# Patient Record
Sex: MALE | Race: WHITE | NOT HISPANIC OR LATINO | Employment: FULL TIME | URBAN - METROPOLITAN AREA
[De-identification: names, ages, dates, MRNs, and addresses within clinical notes are randomized per-mention and may not be internally consistent; named-entity substitution may affect disease eponyms.]

---

## 2017-01-30 ENCOUNTER — OFFICE VISIT (OUTPATIENT)
Dept: INTERNAL MEDICINE | Facility: CLINIC | Age: 57
End: 2017-01-30

## 2017-01-30 VITALS
HEIGHT: 68 IN | TEMPERATURE: 98.3 F | BODY MASS INDEX: 34.56 KG/M2 | RESPIRATION RATE: 16 BRPM | SYSTOLIC BLOOD PRESSURE: 124 MMHG | DIASTOLIC BLOOD PRESSURE: 92 MMHG | WEIGHT: 228 LBS | HEART RATE: 72 BPM

## 2017-01-30 DIAGNOSIS — N40.0 BENIGN NON-NODULAR PROSTATIC HYPERPLASIA WITHOUT LOWER URINARY TRACT SYMPTOMS: ICD-10-CM

## 2017-01-30 DIAGNOSIS — Z23 NEED FOR PROPHYLACTIC VACCINATION AND INOCULATION AGAINST INFLUENZA: ICD-10-CM

## 2017-01-30 DIAGNOSIS — Z00.00 PHYSICAL EXAM: Primary | ICD-10-CM

## 2017-01-30 LAB
ARTICHOKE IGE QN: 67 MG/DL (ref 0–130)
BILIRUB BLD-MCNC: NEGATIVE MG/DL
CHOLEST SERPL-MCNC: 149 MG/DL (ref 0–200)
CLARITY, POC: CLEAR
COLOR UR: YELLOW
EXPIRATION DATE: NORMAL
GLUCOSE UR STRIP-MCNC: NEGATIVE MG/DL
HCV AB SER DONR QL: NORMAL
HDLC SERPL-MCNC: 57 MG/DL (ref 40–60)
KETONES UR QL: NEGATIVE
LEUKOCYTE EST, POC: NEGATIVE
Lab: NORMAL
NITRITE UR-MCNC: NEGATIVE MG/ML
PH UR: 5 [PH] (ref 5–8)
PROT UR STRIP-MCNC: NEGATIVE MG/DL
PSA SERPL-MCNC: 0.7 NG/ML (ref 0–4)
RBC # UR STRIP: NEGATIVE /UL
SP GR UR: 1.02 (ref 1–1.03)
TRIGL SERPL-MCNC: 104 MG/DL (ref 0–150)
UROBILINOGEN UR QL: NORMAL

## 2017-01-30 PROCEDURE — 81003 URINALYSIS AUTO W/O SCOPE: CPT | Performed by: INTERNAL MEDICINE

## 2017-01-30 PROCEDURE — 84153 ASSAY OF PSA TOTAL: CPT | Performed by: INTERNAL MEDICINE

## 2017-01-30 PROCEDURE — 36415 COLL VENOUS BLD VENIPUNCTURE: CPT | Performed by: INTERNAL MEDICINE

## 2017-01-30 PROCEDURE — 86803 HEPATITIS C AB TEST: CPT | Performed by: INTERNAL MEDICINE

## 2017-01-30 PROCEDURE — 90471 IMMUNIZATION ADMIN: CPT | Performed by: INTERNAL MEDICINE

## 2017-01-30 PROCEDURE — 80061 LIPID PANEL: CPT | Performed by: INTERNAL MEDICINE

## 2017-01-30 PROCEDURE — 90686 IIV4 VACC NO PRSV 0.5 ML IM: CPT | Performed by: INTERNAL MEDICINE

## 2017-01-30 PROCEDURE — 99396 PREV VISIT EST AGE 40-64: CPT | Performed by: INTERNAL MEDICINE

## 2017-01-30 NOTE — MR AVS SNAPSHOT
Micah Moore   2017 8:00 AM   Office Visit    Provider:  Navneet Brewster MD   Department:  [facility]   Dept Phone:  [phone]                Your Full Care Plan              Your Updated Medication List      Notice  As of 2017  9:27 AM    You have not been prescribed any medications.            We Performed the Following     Hepatitis C Antibody     Lipid Panel     POC Urinalysis Dipstick, Automated     PSA       You Were Diagnosed With        Codes Comments    Physical exam    -  Primary ICD-10-CM: Z00.00  ICD-9-CM: V70.9     Benign non-nodular prostatic hyperplasia without lower urinary tract symptoms     ICD-10-CM: N40.0  ICD-9-CM: 600.90       Instructions     None    Patient Instructions History      [employer] Signup     [facility] [employer] allows you to send messages to your doctor, view your test results, renew your prescriptions, schedule appointments, and more. To sign up, go to [employer] and click on the Sign Up Now link in the New User? box. Enter your [employer] Activation Code exactly as it appears below along with the last four digits of your Social Security Number and your Date of Birth () to complete the sign-up process. If you do not sign up before the expiration date, you must request a new code.    [employer] Activation Code: [email or web address]  Expires: 2017  9:27 AM    If you have questions, you can email [email or web address] or call [phone] to talk to our [employer] staff. Remember, [employer] is NOT to be used for urgent needs. For medical emergencies, dial 911.               Other Info from Your Visit           Allergies     No Known Allergies      Reason for Visit     Annual Exam           Vital Signs     Blood Pressure Pulse Temperature Respirations    124/92 (BP Location: Left arm, Patient Position: Sitting, Cuff Size: Adult) 72 98.3 °F (36.8 °C) (Temporal Artery ) 16    "Height Weight Body Mass Index Smoking Status    67.75\" (172.1 cm) 228 lb (103 kg) 34.92 kg/m2 Never Smoker      Problems and Diagnoses Noted     Physical exam    -  Primary    Benign non-nodular prostatic hyperplasia without lower urinary tract symptoms          Results       "

## 2017-01-30 NOTE — PROGRESS NOTES
"Chief Complaint   Patient presents with   • Annual Exam       History of Present Illness      The patient presents for an established patient physical examination and health maintenance visit. The patient has had a colonoscopy. The last colonoscopy was on March 2, 2015. Most recent colonoscopy findings: polyps. His Tdap is up to date. He gets minimal exercise.    Medications    No current outpatient prescriptions on file.     Allergies    No Known Allergies    Problem List    Patient Active Problem List   Diagnosis   • Enlarged prostate without lower urinary tract symptoms (luts)       Physical Examination    Visit Vitals   • /92 (BP Location: Left arm, Patient Position: Sitting, Cuff Size: Adult)   • Pulse 72   • Temp 98.3 °F (36.8 °C) (Temporal Artery )   • Resp 16   • Ht 67.75\" (172.1 cm)   • Wt 228 lb (103 kg)   • BMI 34.92 kg/m2       HEENT: Head- Normocephalic Atraumatic. Facies- Within normal limits. Pinnas- Normal texture and shape bilaterally. Canals- Normal bilaterally. TMs- Normal bilaterally. Nares- Patent bilaterally. Nasal Septum- is normal. There is no tenderness to palpation over the frontal or maxillary sinuses. Lids- Normal bilaterally. Conjunctiva- Clear bilaterally. Sclera- Anicteric bilaterally. Oropharynx- Moist with no lesions. Tonsils- No enlargement, erythema or exudate.    Neck: Thyroid- non enlarged, symmetric and has no nodules. No bruits are detected. ROM- Normal Range of Motion with no rigidity.    Lymph Nodes: Cervical- no enlarged lymph nodes noted. Clavicular- Deferred. Axillary- Deferred. Inguinal- Deferred.    Lungs: Auscultation- Clear to auscultation bilaterally. There are no retractions, clubbing or cyanosis. The Expiratory to Inspiratory ratio is equal.    Cardiovascular: There are no carotid bruits. Heart- Normal Rate with Regular rhythm and no murmurs. There are no gallops. There are no rubs. In the lower extremities there is no edema. The upper extremities do not have " edema.    Abdomen: Soft, benign, non-tender with no masses, hernias, organomegaly or scars.    Male Genitourinary: The penis is circumcised with testicles found in the scrotum bilaterally. Testicular Size is normal bilaterally. The penis has no anatomic abnormalities.    Rectal: reveals normal external sphincter tone with no external lesions.    Prostate: The prostate gland is enlarged diffusely with no nodules.    The patient has no worrisome or suspicious skin lesions noted.    Impression and Assessment    Normal Physical Examination.    Benign Prostatic Hypertrophy.    Plan    Benign Prostatic Hypertrophy Plan: Further plans will be made after test results are available.    Counseling was provided regarding: Adequate Aerobic Exercise, Heart Healthy Diet, Low Fat Diet and Seat Belt Utilization.    The following was ordered for screening and health maintenance: Hepatitis C Antibody, Lipid Profile and U/A.       Immunizations Ordered and Administered: None.    Micah was seen today for annual exam.    Diagnoses and all orders for this visit:    Physical exam  -     POC Urinalysis Dipstick, Automated  -     Lipid Panel  -     PSA  -     Hepatitis C Antibody    Benign non-nodular prostatic hyperplasia without lower urinary tract symptoms  -     POC Urinalysis Dipstick, Automated  -     PSA          Return to Office    The patient was instructed to return for follow-up in 6 months. Next Visit: Follow-up.    The patient was instructed to return sooner if the condition changes, worsens, or doesn't resolve.

## 2017-04-07 ENCOUNTER — OFFICE VISIT (OUTPATIENT)
Dept: ORTHOPEDIC SURGERY | Facility: CLINIC | Age: 57
End: 2017-04-07

## 2017-04-07 VITALS
SYSTOLIC BLOOD PRESSURE: 150 MMHG | HEIGHT: 68 IN | DIASTOLIC BLOOD PRESSURE: 103 MMHG | BODY MASS INDEX: 34.1 KG/M2 | HEART RATE: 63 BPM | WEIGHT: 225 LBS

## 2017-04-07 DIAGNOSIS — R52 PAIN: ICD-10-CM

## 2017-04-07 DIAGNOSIS — M77.51 BURSITIS OF RIGHT FOOT: Primary | ICD-10-CM

## 2017-04-07 DIAGNOSIS — B07.0 PLANTAR WART OF RIGHT FOOT: ICD-10-CM

## 2017-04-07 PROCEDURE — 99203 OFFICE O/P NEW LOW 30 MIN: CPT | Performed by: PHYSICIAN ASSISTANT

## 2017-04-07 NOTE — PROGRESS NOTES
Subjective   Micah Moore is a 57 y.o. male.     Pain   Associated symptoms include headaches. Fever: gain.        {Common H&P Review Areas:11172}    Review of Systems   Constitutional: Positive for unexpected weight change. Fever: gain.   Eyes: Positive for visual disturbance.   Respiratory: Negative.         Snoring   Cardiovascular: Positive for leg swelling.   Gastrointestinal: Negative.    Endocrine: Negative.    Genitourinary: Positive for frequency.   Musculoskeletal: Negative.         Joint pain/swelling,  muscle pain   Skin: Negative.         itching   Allergic/Immunologic: Negative.    Neurological: Positive for headaches.   Hematological: Negative.    Psychiatric/Behavioral: Negative.        Objective   Physical Exam    Assessment/Plan   {Assess/PlanSmartLinks:03207}

## 2017-04-07 NOTE — PROGRESS NOTES
Subjective   Micah Moore is a 57 y.o. male.     History of Present Illness     {Common H&P Review Areas:74797}    Review of Systems   Constitutional: Positive for unexpected weight change. Fever: gain.   Eyes: Positive for visual disturbance.   Respiratory: Negative.         Snoring   Cardiovascular: Positive for leg swelling.   Gastrointestinal: Negative.    Endocrine: Negative.    Genitourinary: Positive for frequency.   Musculoskeletal: Negative.         Joint pain/swelling,  muscle pain   Skin: Negative.         itching   Allergic/Immunologic: Negative.    Neurological: Positive for headaches.   Hematological: Negative.    Psychiatric/Behavioral: Negative.        Objective   Physical Exam    Assessment/Plan   {Assess/PlanSmartLinks:14403}
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
## 2018-01-31 ENCOUNTER — OFFICE VISIT (OUTPATIENT)
Dept: INTERNAL MEDICINE | Facility: CLINIC | Age: 58
End: 2018-01-31

## 2018-01-31 ENCOUNTER — HOSPITAL ENCOUNTER (OUTPATIENT)
Dept: GENERAL RADIOLOGY | Facility: HOSPITAL | Age: 58
Discharge: HOME | End: 2018-01-31
Attending: INTERNAL MEDICINE | Admitting: INTERNAL MEDICINE

## 2018-01-31 VITALS
TEMPERATURE: 99.1 F | DIASTOLIC BLOOD PRESSURE: 78 MMHG | RESPIRATION RATE: 16 BRPM | HEART RATE: 72 BPM | BODY MASS INDEX: 35.01 KG/M2 | HEIGHT: 68 IN | WEIGHT: 231 LBS | SYSTOLIC BLOOD PRESSURE: 136 MMHG

## 2018-01-31 DIAGNOSIS — Z00.00 HEALTHCARE MAINTENANCE: ICD-10-CM

## 2018-01-31 DIAGNOSIS — Z00.00 PHYSICAL EXAM: Primary | ICD-10-CM

## 2018-01-31 DIAGNOSIS — G89.29 NECK PAIN, CHRONIC: ICD-10-CM

## 2018-01-31 DIAGNOSIS — M54.2 NECK PAIN, CHRONIC: ICD-10-CM

## 2018-01-31 DIAGNOSIS — N40.0 ENLARGED PROSTATE WITHOUT LOWER URINARY TRACT SYMPTOMS (LUTS): ICD-10-CM

## 2018-01-31 LAB
ALBUMIN SERPL-MCNC: 4.4 G/DL (ref 3.2–4.8)
ALBUMIN/GLOB SERPL: 1.6 G/DL (ref 1.5–2.5)
ALP SERPL-CCNC: 63 U/L (ref 25–100)
ALT SERPL W P-5'-P-CCNC: 32 U/L (ref 7–40)
ANION GAP SERPL CALCULATED.3IONS-SCNC: 7 MMOL/L (ref 3–11)
ARTICHOKE IGE QN: 65 MG/DL (ref 0–130)
AST SERPL-CCNC: 30 U/L (ref 0–33)
BILIRUB BLD-MCNC: NEGATIVE MG/DL
BILIRUB SERPL-MCNC: 0.6 MG/DL (ref 0.3–1.2)
BUN BLD-MCNC: 18 MG/DL (ref 9–23)
BUN/CREAT SERPL: 18 (ref 7–25)
CALCIUM SPEC-SCNC: 9.2 MG/DL (ref 8.7–10.4)
CHLORIDE SERPL-SCNC: 102 MMOL/L (ref 99–109)
CHOLEST SERPL-MCNC: 128 MG/DL (ref 0–200)
CLARITY, POC: CLEAR
CO2 SERPL-SCNC: 27 MMOL/L (ref 20–31)
COLOR UR: YELLOW
CREAT BLD-MCNC: 1 MG/DL (ref 0.6–1.3)
EXPIRATION DATE: ABNORMAL
GFR SERPL CREATININE-BSD FRML MDRD: 77 ML/MIN/1.73
GLOBULIN UR ELPH-MCNC: 2.8 GM/DL
GLUCOSE BLD-MCNC: 101 MG/DL (ref 70–100)
GLUCOSE UR STRIP-MCNC: NEGATIVE MG/DL
HDLC SERPL-MCNC: 51 MG/DL (ref 40–60)
KETONES UR QL: ABNORMAL
LEUKOCYTE EST, POC: NEGATIVE
Lab: ABNORMAL
NITRITE UR-MCNC: NEGATIVE MG/ML
PH UR: 5 [PH] (ref 5–8)
POTASSIUM BLD-SCNC: 4.3 MMOL/L (ref 3.5–5.5)
PROT SERPL-MCNC: 7.2 G/DL (ref 5.7–8.2)
PROT UR STRIP-MCNC: NEGATIVE MG/DL
PSA SERPL-MCNC: 0.6 NG/ML (ref 0–4)
RBC # UR STRIP: NEGATIVE /UL
SODIUM BLD-SCNC: 136 MMOL/L (ref 132–146)
SP GR UR: 1.01 (ref 1–1.03)
TRIGL SERPL-MCNC: 81 MG/DL (ref 0–150)
UROBILINOGEN UR QL: NORMAL

## 2018-01-31 PROCEDURE — 72040 X-RAY EXAM NECK SPINE 2-3 VW: CPT

## 2018-01-31 PROCEDURE — 80053 COMPREHEN METABOLIC PANEL: CPT | Performed by: INTERNAL MEDICINE

## 2018-01-31 PROCEDURE — G0103 PSA SCREENING: HCPCS | Performed by: INTERNAL MEDICINE

## 2018-01-31 PROCEDURE — 17000 DESTRUCT PREMALG LESION: CPT | Performed by: INTERNAL MEDICINE

## 2018-01-31 PROCEDURE — 81003 URINALYSIS AUTO W/O SCOPE: CPT | Performed by: INTERNAL MEDICINE

## 2018-01-31 PROCEDURE — 99396 PREV VISIT EST AGE 40-64: CPT | Performed by: INTERNAL MEDICINE

## 2018-01-31 PROCEDURE — 36415 COLL VENOUS BLD VENIPUNCTURE: CPT | Performed by: INTERNAL MEDICINE

## 2018-01-31 PROCEDURE — 80061 LIPID PANEL: CPT | Performed by: INTERNAL MEDICINE

## 2018-01-31 PROCEDURE — 17003 DESTRUCT PREMALG LES 2-14: CPT | Performed by: INTERNAL MEDICINE

## 2018-01-31 NOTE — PROGRESS NOTES
"Chief Complaint   Patient presents with   • Annual Exam       History of Present Illness    The patient presents for an established patient physical examination and health maintenance visit. The patient has had a colonoscopy.    The patient presents for a follow-up related to neck pain which began three months ago. He states that the pain was first noted to come on insidiously. The pain is not associated with fever, chills, weight loss, rashes, dysuria, hematuria or a decreased urine stream. The patient denies a past history of malignancy. The pain does not radiate. There is no numbness. The patient denies loss of bladder control. The patient denies loss of bowel control. The patient has attempted hot packs. The heat did give pain relief. The patient reports no aggravating factors.    Medications    No current outpatient prescriptions on file.     Allergies    No Known Allergies    Problem List    Patient Active Problem List   Diagnosis   • Enlarged prostate without lower urinary tract symptoms (luts)       Medications, Allergies, Problems List and Past History were reviewed and updated.    Physical Examination    /78 (BP Location: Left arm, Patient Position: Sitting, Cuff Size: Adult)  Pulse 72  Temp 99.1 °F (37.3 °C) (Temporal Artery )   Resp 16  Ht 172.7 cm (68\")  Wt 105 kg (231 lb)  BMI 35.12 kg/m2    HEENT: Head- Normocephalic Atraumatic. Facies- Within normal limits. Pinnas- Normal texture and shape bilaterally. Canals- Normal bilaterally. TMs- Normal bilaterally. Nares- Patent bilaterally. Nasal Septum- is normal. There is no tenderness to palpation over the frontal or maxillary sinuses. Lids- Normal bilaterally. Conjunctiva- Clear bilaterally. Sclera- Anicteric bilaterally. Oropharynx- Moist with no lesions. Tonsils- No enlargement, erythema or exudate.    Neck: Thyroid- non enlarged, symmetric and has no nodules. No bruits are detected. ROM- Normal Range of Motion with no rigidity.    Lymph " Nodes: Cervical- no enlarged lymph nodes noted. Clavicular- Deferred. Axillary- Deferred. Inguinal- Deferred.    Lungs: Auscultation- Clear to auscultation bilaterally. There are no retractions, clubbing or cyanosis. The Expiratory to Inspiratory ratio is equal.    Cardiovascular: There are no carotid bruits. Heart- Normal Rate with Regular rhythm and no murmurs. There are no gallops. There are no rubs. In the lower extremities there is no edema. The upper extremities do not have edema.    Abdomen: Soft, benign, non-tender with no masses, hernias, organomegaly or scars.    Male Genitourinary: The penis is uncircumcised with testicles found in the scrotum bilaterally. Testicular Size is normal bilaterally. The penis has no anatomic abnormalities.    Rectal: reveals normal external sphincter tone with no external lesions.    Prostate: The prostate gland is enlarged diffusely with no nodules.    Upper Extremity Neuro Exam: Muscle Strength is 5/5 in all major upper extremity muscle groups. Deep Tendon Reflexes are 2+ and equal in the biceps, triceps and brachioradialis distributions bilaterally. Sensation is normal in all distributions.    Lower Extremity Neuro Exam: Muscle Strength is 5/5 in all major lower extremity muscle groups. Deep Tendon Reflexes are 2+ and equal in the patellar and Achilles distributions bilaterally. Sensation is normal in all distributions.    The patient has a scaly area on his forehead. The scaly area is pink. The affected skin is crusted and flaking and the condition is noted to be well demarcated.    Radiology    My personal interpretation of the x-rays is as stated below:    The x-rays of the cervical region demonstrate normal jamar structure.    Impression and Assessment    Normal Physical Examination.    Actinic keratosis.    Neck Pain.    Benign Prostatic Hypertrophy.    Plan    Actinic keratosis Plan: Cryotherapy was applied.    Benign Prostatic Hypertrophy Plan: Further plans will be  made after test results are available.    Neck Pain Plan: Ibuprofen may be used for fever or pain. Monitor the symptoms closely. The patient was instructed to use over the counter NSAIDs for pain and inflammation.    Counseling was provided regarding: Dental Visits, Flossing Teeth, Heart Healthy Diet, Ideal Body Weight, Seat Belt Utilization and Weight Lose.    The following was ordered for screening and health maintenance: Lipid Profile, PSA and U/A.       Immunizations Ordered and Administered: None.    Procedure Notes    Two lesions were frozen with liquid nitrogen utilizing 20 second freezings x1. Wound care was explained.    Micah was seen today for annual exam.    Diagnoses and all orders for this visit:    Physical exam  -     Comprehensive Metabolic Panel  -     Lipid Panel  -     PSA Screen  -     POC Urinalysis Dipstick, Automated    Enlarged prostate without lower urinary tract symptoms (luts)  -     PSA Screen    Neck pain, chronic  -     XR Spine Cervical 2 or 3 View; Future    Healthcare maintenance  -     Comprehensive Metabolic Panel  -     Lipid Panel  -     PSA Screen  -     POC Urinalysis Dipstick, Automated        Return to Office    The patient was instructed to return for follow-up in 1 month.    The patient was instructed to return sooner if the condition changes, worsens, or doesn't resolve.

## 2019-02-05 ENCOUNTER — OFFICE VISIT (OUTPATIENT)
Dept: INTERNAL MEDICINE | Facility: CLINIC | Age: 59
End: 2019-02-05

## 2019-02-05 VITALS
DIASTOLIC BLOOD PRESSURE: 86 MMHG | WEIGHT: 223 LBS | TEMPERATURE: 98.6 F | HEIGHT: 68 IN | HEART RATE: 56 BPM | RESPIRATION RATE: 16 BRPM | BODY MASS INDEX: 33.8 KG/M2 | SYSTOLIC BLOOD PRESSURE: 130 MMHG

## 2019-02-05 DIAGNOSIS — Z23 NEED FOR IMMUNIZATION AGAINST INFLUENZA: ICD-10-CM

## 2019-02-05 DIAGNOSIS — Z00.00 PHYSICAL EXAM: Primary | ICD-10-CM

## 2019-02-05 LAB
ALBUMIN SERPL-MCNC: 4.49 G/DL (ref 3.2–4.8)
ALBUMIN/GLOB SERPL: 2.1 G/DL (ref 1.5–2.5)
ALP SERPL-CCNC: 63 U/L (ref 25–100)
ALT SERPL W P-5'-P-CCNC: 22 U/L (ref 7–40)
ANION GAP SERPL CALCULATED.3IONS-SCNC: 7 MMOL/L (ref 3–11)
ARTICHOKE IGE QN: 80 MG/DL (ref 0–130)
AST SERPL-CCNC: 26 U/L (ref 0–33)
BASOPHILS # BLD AUTO: 0.03 10*3/MM3 (ref 0–0.2)
BASOPHILS NFR BLD AUTO: 0.6 % (ref 0–1)
BILIRUB BLD-MCNC: NEGATIVE MG/DL
BILIRUB SERPL-MCNC: 0.5 MG/DL (ref 0.3–1.2)
BUN BLD-MCNC: 17 MG/DL (ref 9–23)
BUN/CREAT SERPL: 17 (ref 7–25)
CALCIUM SPEC-SCNC: 9.3 MG/DL (ref 8.7–10.4)
CHLORIDE SERPL-SCNC: 106 MMOL/L (ref 99–109)
CHOLEST SERPL-MCNC: 143 MG/DL (ref 0–200)
CLARITY, POC: CLEAR
CO2 SERPL-SCNC: 24 MMOL/L (ref 20–31)
COLOR UR: YELLOW
CREAT BLD-MCNC: 1 MG/DL (ref 0.6–1.3)
DEPRECATED RDW RBC AUTO: 45 FL (ref 37–54)
EOSINOPHIL # BLD AUTO: 0.31 10*3/MM3 (ref 0–0.3)
EOSINOPHIL NFR BLD AUTO: 5.8 % (ref 0–3)
ERYTHROCYTE [DISTWIDTH] IN BLOOD BY AUTOMATED COUNT: 13.6 % (ref 11.3–14.5)
EXPIRATION DATE: ABNORMAL
GFR SERPL CREATININE-BSD FRML MDRD: 77 ML/MIN/1.73
GFR SERPL CREATININE-BSD FRML MDRD: 93 ML/MIN/1.73
GLOBULIN UR ELPH-MCNC: 2.1 GM/DL
GLUCOSE BLD-MCNC: 103 MG/DL (ref 70–100)
GLUCOSE UR STRIP-MCNC: NEGATIVE MG/DL
HCT VFR BLD AUTO: 45.8 % (ref 38.9–50.9)
HDLC SERPL-MCNC: 49 MG/DL (ref 40–60)
HGB BLD-MCNC: 15.2 G/DL (ref 13.1–17.5)
IMM GRANULOCYTES # BLD AUTO: 0.01 10*3/MM3 (ref 0–0.03)
IMM GRANULOCYTES NFR BLD AUTO: 0.2 % (ref 0–0.6)
KETONES UR QL: NEGATIVE
LEUKOCYTE EST, POC: NEGATIVE
LYMPHOCYTES # BLD AUTO: 2.09 10*3/MM3 (ref 0.6–4.8)
LYMPHOCYTES NFR BLD AUTO: 38.9 % (ref 24–44)
Lab: ABNORMAL
MCH RBC QN AUTO: 29.9 PG (ref 27–31)
MCHC RBC AUTO-ENTMCNC: 33.2 G/DL (ref 32–36)
MCV RBC AUTO: 90 FL (ref 80–99)
MONOCYTES # BLD AUTO: 0.36 10*3/MM3 (ref 0–1)
MONOCYTES NFR BLD AUTO: 6.7 % (ref 0–12)
NEUTROPHILS # BLD AUTO: 2.57 10*3/MM3 (ref 1.5–8.3)
NEUTROPHILS NFR BLD AUTO: 47.8 % (ref 41–71)
NITRITE UR-MCNC: NEGATIVE MG/ML
PH UR: 5 [PH] (ref 5–8)
PLATELET # BLD AUTO: 173 10*3/MM3 (ref 150–450)
PMV BLD AUTO: 11.7 FL (ref 6–12)
POTASSIUM BLD-SCNC: 4.2 MMOL/L (ref 3.5–5.5)
PROT SERPL-MCNC: 6.6 G/DL (ref 5.7–8.2)
PROT UR STRIP-MCNC: NEGATIVE MG/DL
PSA SERPL-MCNC: 0.6 NG/ML (ref 0–4)
RBC # BLD AUTO: 5.09 10*6/MM3 (ref 4.2–5.76)
RBC # UR STRIP: ABNORMAL /UL
SODIUM BLD-SCNC: 137 MMOL/L (ref 132–146)
SP GR UR: 1.01 (ref 1–1.03)
TRIGL SERPL-MCNC: 108 MG/DL (ref 0–150)
TSH SERPL DL<=0.05 MIU/L-ACNC: 2.6 MIU/ML (ref 0.35–5.35)
UROBILINOGEN UR QL: NORMAL
WBC NRBC COR # BLD: 5.37 10*3/MM3 (ref 3.5–10.8)

## 2019-02-05 PROCEDURE — 80053 COMPREHEN METABOLIC PANEL: CPT | Performed by: INTERNAL MEDICINE

## 2019-02-05 PROCEDURE — 99396 PREV VISIT EST AGE 40-64: CPT | Performed by: INTERNAL MEDICINE

## 2019-02-05 PROCEDURE — 80061 LIPID PANEL: CPT | Performed by: INTERNAL MEDICINE

## 2019-02-05 PROCEDURE — 81003 URINALYSIS AUTO W/O SCOPE: CPT | Performed by: INTERNAL MEDICINE

## 2019-02-05 PROCEDURE — 36415 COLL VENOUS BLD VENIPUNCTURE: CPT | Performed by: INTERNAL MEDICINE

## 2019-02-05 PROCEDURE — 85025 COMPLETE CBC W/AUTO DIFF WBC: CPT | Performed by: INTERNAL MEDICINE

## 2019-02-05 PROCEDURE — G0103 PSA SCREENING: HCPCS | Performed by: INTERNAL MEDICINE

## 2019-02-05 PROCEDURE — 84443 ASSAY THYROID STIM HORMONE: CPT | Performed by: INTERNAL MEDICINE

## 2019-02-05 PROCEDURE — 90471 IMMUNIZATION ADMIN: CPT | Performed by: INTERNAL MEDICINE

## 2019-02-05 PROCEDURE — 90686 IIV4 VACC NO PRSV 0.5 ML IM: CPT | Performed by: INTERNAL MEDICINE

## 2019-02-05 NOTE — PATIENT INSTRUCTIONS
"Heart-Healthy Eating Plan  Heart-healthy meal planning includes:  · Limiting unhealthy fats.  · Increasing healthy fats.  · Making other small dietary changes.    You may need to talk with your doctor or a diet specialist (dietitian) to create an eating plan that is right for you.  What types of fat should I choose?  · Choose healthy fats. These include olive oil and canola oil, flaxseeds, walnuts, almonds, and seeds.  · Eat more omega-3 fats. These include salmon, mackerel, sardines, tuna, flaxseed oil, and ground flaxseeds. Try to eat fish at least twice each week.  · Limit saturated fats.  ? Saturated fats are often found in animal products, such as meats, butter, and cream.  ? Plant sources of saturated fats include palm oil, palm kernel oil, and coconut oil.  · Avoid foods with partially hydrogenated oils in them. These include stick margarine, some tub margarines, cookies, crackers, and other baked goods. These contain trans fats.  What general guidelines do I need to follow?  · Check food labels carefully. Identify foods with trans fats or high amounts of saturated fat.  · Fill one half of your plate with vegetables and green salads. Eat 4-5 servings of vegetables per day. A serving of vegetables is:  ? 1 cup of raw leafy vegetables.  ? ½ cup of raw or cooked cut-up vegetables.  ? ½ cup of vegetable juice.  · Fill one fourth of your plate with whole grains. Look for the word \"whole\" as the first word in the ingredient list.  · Fill one fourth of your plate with lean protein foods.  · Eat 4-5 servings of fruit per day. A serving of fruit is:  ? One medium whole fruit.  ? ¼ cup of dried fruit.  ? ½ cup of fresh, frozen, or canned fruit.  ? ½ cup of 100% fruit juice.  · Eat more foods that contain soluble fiber. These include apples, broccoli, carrots, beans, peas, and barley. Try to get 20-30 g of fiber per day.  · Eat more home-cooked food. Eat less restaurant, buffet, and fast food.  · Limit or avoid " alcohol.  · Limit foods high in starch and sugar.  · Avoid fried foods.  · Avoid frying your food. Try baking, boiling, grilling, or broiling it instead. You can also reduce fat by:  ? Removing the skin from poultry.  ? Removing all visible fats from meats.  ? Skimming the fat off of stews, soups, and gravies before serving them.  ? Steaming vegetables in water or broth.  · Lose weight if you are overweight.  · Eat 4-5 servings of nuts, legumes, and seeds per week:  ? One serving of dried beans or legumes equals ½ cup after being cooked.  ? One serving of nuts equals 1½ ounces.  ? One serving of seeds equals ½ ounce or one tablespoon.  · You may need to keep track of how much salt or sodium you eat. This is especially true if you have high blood pressure. Talk with your doctor or dietitian to get more information.  What foods can I eat?  Grains  Breads, including Gambian, white, kyle, wheat, raisin, rye, oatmeal, and Italian. Tortillas that are neither fried nor made with lard or trans fat. Low-fat rolls, including hotdog and hamburger buns and English muffins. Biscuits. Muffins. Waffles. Pancakes. Light popcorn. Whole-grain cereals. Flatbread. [place] toast. Pretzels. Breadsticks. Rusks. Low-fat snacks. Low-fat crackers, including oyster, saltine, matzo, abhay, animal, and rye. Rice and pasta, including brown rice and pastas that are made with whole wheat.  Vegetables  All vegetables.  Fruits  All fruits, but limit coconut.  Meats and Other Protein Sources  Lean, well-trimmed beef, veal, pork, and lamb. Chicken and turkey without skin. All fish and shellfish. Wild duck, rabbit, pheasant, and venison. Egg whites or low-cholesterol egg substitutes. Dried beans, peas, lentils, and tofu. Seeds and most nuts.  Dairy  Low-fat or nonfat cheeses, including ricotta, string, and mozzarella. Skim or 1% milk that is liquid, powdered, or evaporated. Buttermilk that is made with low-fat milk. Nonfat or low-fat  yogurt.  Beverages  Mineral water. Diet carbonated beverages.  Sweets and Desserts  Sherbets and fruit ices. Honey, jam, marmalade, jelly, and syrups. Meringues and gelatins. Pure sugar candy, such as hard candy, jelly beans, gumdrops, mints, marshmallows, and small amounts of dark chocolate. Conrad food cake.  Eat all sweets and desserts in moderation.  Fats and Oils  Nonhydrogenated (trans-free) margarines. Vegetable oils, including soybean, sesame, sunflower, olive, peanut, safflower, corn, canola, and cottonseed. Salad dressings or mayonnaise made with a vegetable oil. Limit added fats and oils that you use for cooking, baking, salads, and as spreads.  Other  Cocoa powder. Coffee and tea. All seasonings and condiments.  The items listed above may not be a complete list of recommended foods or beverages. Contact your dietitian for more options.  What foods are not recommended?  Grains  Breads that are made with saturated or trans fats, oils, or whole milk. Croissants. Butter rolls. Cheese breads. Sweet rolls. Donuts. Buttered popcorn. Chow mein noodles. High-fat crackers, such as cheese or butter crackers.  Meats and Other Protein Sources  Fatty meats, such as hotdogs, short ribs, sausage, spareribs, gonzalez, rib eye roast or steak, and mutton. High-fat deli meats, such as salami and bologna. Caviar. Domestic duck and goose. Organ meats, such as kidney, liver, sweetbreads, and heart.  Dairy  Cream, sour cream, cream cheese, and creamed cottage cheese. Whole-milk cheeses, including blue (rigoberto), Blaine Enrique, Brie, Thomas, American, Havarti, Swiss, cheddar, Camembert, and [place]. Whole or 2% milk that is liquid, evaporated, or condensed. Whole buttermilk. Cream sauce or high-fat cheese sauce. Yogurt that is made from whole milk.  Beverages  Regular sodas and juice drinks with added sugar.  Sweets and Desserts  Frosting. Pudding. Cookies. Cakes other than conrad food cake. Candy that has milk chocolate or white  chocolate, hydrogenated fat, butter, coconut, or unknown ingredients. Buttered syrups. Full-fat ice cream or ice cream drinks.  Fats and Oils  Gravy that has suet, meat fat, or shortening. Cocoa butter, hydrogenated oils, palm oil, coconut oil, palm kernel oil. These can often be found in baked products, candy, fried foods, nondairy creamers, and whipped toppings. Solid fats and shortenings, including gonzalez fat, salt pork, lard, and butter. Nondairy cream substitutes, such as coffee creamers and sour cream substitutes. Salad dressings that are made of unknown oils, cheese, or sour cream.  The items listed above may not be a complete list of foods and beverages to avoid. Contact your dietitian for more information.  This information is not intended to replace advice given to you by your health care provider. Make sure you discuss any questions you have with your health care provider.  Document Released: 06/18/2013 Document Revised: 05/25/2017 Document Reviewed: 06/11/2015  [employer] Patient Education © 2018 [employer]

## 2019-02-05 NOTE — PROGRESS NOTES
Chief Complaint   Patient presents with   • Annual Exam       History of Present Illness    The patient presents for an established patient physical examination and health maintenance visit.    Review of Systems    CONSTITUTIONAL- Denies Unexplained Weight Loss, Fever, Chills, Sweats, Fatigue, Weakness or Malaise.    NECK- Denies Decreased Range of Motion, Stiffness, Thyroid Nodules, Enlarged Lymph Nodes or Goiter.    EYES- Denies Eye Pain, Eye Drainage, Eye Redness, Eye Discharge, Decreased Vision, Visual Disturbance, Diplopia, Visual Loss or Swollen Eyelid.    HENT- Denies Nasal Discharge, Sore Throat, Ear Pain, Ear Drainage, Headache, Sinus Pain, Nasal Congestion, Decreased Hearing or Tinnitus.    PULMONARY- Denies Wheezing, Dyspnea, Sputum Production, Cough, Hemoptysis or Pleuritic Chest Pain.    GASTROINTESTINAL- Denies Abdominal Pain, Nausea, Vomiting, Diarrhea, Blood per Rectum, Constipation or Heartburn.    GENITOURINARY- Denies Penile Discharge, Erectile Dysfunction, Testicular Mass, Testicular Pain, Hernia, Nocturia, Decreased Urine Stream, Incomplete Voiding, Urinary Frequency, Urinary Urgency, Dysuria or Hematuria.    CARDIOVASCULAR- Denies Chest Pain, Claudication, Edema, Syncope, Palpitations or Irregular Heart Beat.    ENDOCRINE- Denies Cold Intolerance, Depression, Hair Loss, Heat Intolerance, Memory Loss, Polydypsia, Polyphagia, Polyuria, Sleep Disturbance or Weight Gain.    NEUROLOGIC- Denies Seizures, Visual Changes, Confusion or Excessive Daytime Sleepiness.    MUSCULOSKELETAL- Denies Joint Pain, Joint Stiffness, Decreased Range of Motion, Joint Swelling or Erythema of Joints.    INTEGUMENTARY- Denies Rash, Lumps, Sores, Itching, Dryness, Color Change, Changes in Hair, Brittle Nails, Discolored Nails, Thickened Nails, Growths or Alopecia.    Medications    No current outpatient medications on file.     Allergies    No Known Allergies    Problem List    Patient Active Problem List   Diagnosis   •  "Enlarged prostate without lower urinary tract symptoms (luts)       Medications, Allergies, Problems List and Past History were reviewed and updated.    Physical Examination    /86   Pulse 56   Temp 98.6 °F (37 °C) (Temporal)   Resp 16   Ht 173.4 cm (68.25\")   Wt 101 kg (223 lb)   BMI 33.66 kg/m²     HEENT: Head- Normocephalic Atraumatic. Facies- Within normal limits. Pinnas- Normal texture and shape bilaterally. Canals- Normal bilaterally. TMs- Normal bilaterally. Nares- Patent bilaterally. Nasal Septum- is normal. There is no tenderness to palpation over the frontal or maxillary sinuses. Lids- Normal bilaterally. Conjunctiva- Clear bilaterally. Sclera- Anicteric bilaterally. Oropharynx- Moist with no lesions. Tonsils- No enlargement, erythema or exudate.    Neck: Thyroid- non enlarged, symmetric and has no nodules. No bruits are detected. ROM- Normal Range of Motion with no rigidity.    Lungs: Auscultation- Clear to auscultation bilaterally. There are no retractions, clubbing or cyanosis. The Expiratory to Inspiratory ratio is equal.    Lymph Nodes: Cervical- no enlarged lymph nodes noted. Clavicular- Deferred. Axillary- Deferred. Inguinal- Deferred.    Cardiovascular: There are no carotid bruits. Heart- Normal Rate with Regular rhythm and no murmurs. There are no gallops. There are no rubs. In the lower extremities there is no edema. The upper extremities do not have edema.    Abdomen: Soft, benign, non-tender with no masses, hernias, organomegaly or scars.    Male Genitourinary: The penis is uncircumcised with testicles found in the scrotum bilaterally. Testicular Size is normal bilaterally. The penis has no anatomic abnormalities.    Rectal: reveals normal external sphincter tone with no external lesions.    Prostate: The prostate gland is symmetric and smooth with no nodularity.    Dermatologic: The patient has no worrisome or suspicious skin lesions noted.    Impression and Assessment    Normal " Physical Examination.    Encounter for Screening for Malignant Neoplasm of the Prostate.    Encounter for Immunization Administration.    Plan    Counseling was provided regarding: Adequate Aerobic Exercise, Dental Visits, Flossing Teeth and Heart Healthy Diet.    The following was ordered for screening and health maintenance: CBC w Automated Diff, CMP, Lipid Profile, PSA, TSH and U/A.    Counseled regarding immunizations and applicable VIS given.    Immunizations Ordered and Administered: Influenza.    Micah was seen today for annual exam.    Diagnoses and all orders for this visit:    Physical exam  -     Comprehensive Metabolic Panel  -     Lipid Panel  -     TSH  -     CBC & Differential  -     POC Urinalysis Dipstick, Automated  -     PSA Screen    Need for immunization against influenza  -     Fluarix/Fluzone/Afluria Quad>6 Months; Standing  -     Fluarix/Fluzone/Afluria Quad>6 Months        Return to Office    The patient was instructed to return for follow-up in 1 year.    The patient was instructed to return sooner if the condition changes, worsens, or does not resolve.

## 2019-10-09 ENCOUNTER — OFFICE VISIT (OUTPATIENT)
Dept: INTERNAL MEDICINE | Facility: CLINIC | Age: 59
End: 2019-10-09

## 2019-10-09 VITALS
BODY MASS INDEX: 32.98 KG/M2 | HEIGHT: 68 IN | HEART RATE: 69 BPM | DIASTOLIC BLOOD PRESSURE: 82 MMHG | RESPIRATION RATE: 18 BRPM | WEIGHT: 217.6 LBS | SYSTOLIC BLOOD PRESSURE: 140 MMHG | TEMPERATURE: 97.9 F | OXYGEN SATURATION: 96 %

## 2019-10-09 DIAGNOSIS — J01.80 OTHER ACUTE SINUSITIS, RECURRENCE NOT SPECIFIED: Primary | ICD-10-CM

## 2019-10-09 DIAGNOSIS — J02.9 ACUTE PHARYNGITIS, UNSPECIFIED ETIOLOGY: ICD-10-CM

## 2019-10-09 DIAGNOSIS — B36.0 TINEA VERSICOLOR: ICD-10-CM

## 2019-10-09 DIAGNOSIS — H60.393 OTHER INFECTIVE ACUTE OTITIS EXTERNA OF BOTH EARS: ICD-10-CM

## 2019-10-09 LAB
EXPIRATION DATE: NORMAL
INTERNAL CONTROL: NORMAL
Lab: NORMAL
S PYO AG THROAT QL: NEGATIVE

## 2019-10-09 PROCEDURE — 99214 OFFICE O/P EST MOD 30 MIN: CPT | Performed by: INTERNAL MEDICINE

## 2019-10-09 PROCEDURE — 87880 STREP A ASSAY W/OPTIC: CPT | Performed by: INTERNAL MEDICINE

## 2019-10-09 RX ORDER — KETOCONAZOLE 20 MG/G
CREAM TOPICAL 2 TIMES DAILY
Qty: 60 G | Refills: 1 | Status: SHIPPED | OUTPATIENT
Start: 2019-10-09 | End: 2019-10-23

## 2019-10-09 RX ORDER — AMOXICILLIN 875 MG/1
875 TABLET, COATED ORAL 2 TIMES DAILY
Qty: 20 TABLET | Refills: 0 | Status: SHIPPED | OUTPATIENT
Start: 2019-10-09 | End: 2019-10-19

## 2019-10-09 RX ORDER — OFLOXACIN 3 MG/ML
10 SOLUTION AURICULAR (OTIC) DAILY
Qty: 10 ML | Refills: 0 | Status: SHIPPED | OUTPATIENT
Start: 2019-10-09 | End: 2019-10-16

## 2019-10-09 NOTE — PROGRESS NOTES
Subjective       Micah Moore is a 59 y.o. male.     Chief Complaint   Patient presents with   • Earache     x 1 month,facial pressure/pain, headache,       History obtained from the patient.      Earache    There is pain in both (right first) ears. This is a new problem. Episode onset: 1 month ago. Episode frequency: intermittent. The problem has been unchanged. There has been no fever. Pain severity now: mild to moderate. Associated symptoms include headaches, neck pain, a rash and a sore throat. Pertinent negatives include no abdominal pain, coughing, diarrhea, ear discharge, hearing loss, rhinorrhea or vomiting. He has tried NSAIDs for the symptoms. The treatment provided moderate relief. There is no history of a chronic ear infection or hearing loss.   Rash   This is a new problem. Episode onset: 2-3 months ago. The problem is unchanged. The affected locations include the back. Rash characteristics: white spots. He was exposed to nothing. Associated symptoms include fatigue and a sore throat. Pertinent negatives include no congestion, cough, diarrhea, eye pain, fever, rhinorrhea, shortness of breath or vomiting. Past treatments include nothing. There is no history of allergies, asthma or eczema.        The following portions of the patient's history were reviewed and updated as appropriate: allergies, current medications, past family history, past medical history, past social history, past surgical history and problem list.      Review of Systems   Constitutional: Positive for fatigue. Negative for chills and fever.   HENT: Positive for ear pain and sore throat. Negative for congestion, ear discharge, hearing loss, postnasal drip, rhinorrhea, sinus pressure, sinus pain and voice change.    Eyes: Negative for pain, discharge, redness and itching.   Respiratory: Negative for cough, shortness of breath and wheezing.    Cardiovascular: Negative for chest pain.   Gastrointestinal: Positive for nausea. Negative  "for abdominal pain, diarrhea and vomiting.   Musculoskeletal: Positive for arthralgias (chronic), myalgias and neck pain.   Skin: Positive for rash.   Neurological: Positive for weakness and headaches.   Hematological: Positive for adenopathy (?).           Objective     Blood pressure 140/82, pulse 69, temperature 97.9 °F (36.6 °C), temperature source Temporal, resp. rate 18, height 173.4 cm (68.25\"), weight 98.7 kg (217 lb 9.6 oz), SpO2 96 %.    Physical Exam   Constitutional:   Obese.   HENT:   Head: Normocephalic and atraumatic.   Right Ear: Tympanic membrane, external ear and ear canal normal.   Left Ear: Tympanic membrane, external ear and ear canal normal.   Mouth/Throat: Mucous membranes are normal. No oral lesions. Posterior oropharyngeal erythema (mild) present. No posterior oropharyngeal edema. Tonsils are 1+ on the right. Tonsils are 1+ on the left. No tonsillar exudate (mild erythema+).   Tonsils normal.  There is bilateral frontal, but not maxillary, sinus tenderness to palpation.   Eyes: Conjunctivae are normal.   Neck: Normal range of motion. Neck supple.   Cardiovascular: Normal rate and regular rhythm.   No murmur heard.  Pulmonary/Chest: Effort normal and breath sounds normal.   Lymphadenopathy:     He has no cervical adenopathy (there is tenderness to palpation in the bilateral anterior, but not posterior, cervical area).   Skin: Rash (hypopigmented macular rash diffusely over middla and upper back) noted.   Psychiatric: He has a normal mood and affect.   Nursing note and vitals reviewed.      Results for orders placed or performed in visit on 10/09/19   POC Rapid Strep A   Result Value Ref Range    Rapid Strep A Screen Negative Negative, VALID, INVALID, Not Performed    Internal Control Passed Passed    Lot Number [id number]     Expiration Date 2-16-21        Assessment/Plan   Micah was seen today for earache.    Diagnoses and all orders for this visit:    Other acute sinusitis, recurrence not " specified  -     amoxicillin (AMOXIL) 875 MG tablet; Take 1 tablet by mouth 2 (Two) Times a Day for 10 days.   Recommended Flonase and Sudafed over-the-counter, and plenty of fluids.     The patient agrees to call if his headache does not resolve within 2 to 3 days.    Acute pharyngitis, unspecified etiology  -     POC Rapid Strep A    Other infective acute otitis externa of both ears  -     ofloxacin (FLOXIN OTIC) 0.3 % otic solution; Administer 10 drops into ear(s) as directed by provider Daily for 7 days.    Tinea versicolor  -     ketoconazole (NIZORAL) 2 % cream; Apply  topically to the appropriate area as directed 2 (Two) Times a Day for 14 days.        Return if symptoms worsen or fail to improve.

## 2020-04-23 ENCOUNTER — OFFICE VISIT (OUTPATIENT)
Dept: INTERNAL MEDICINE | Facility: CLINIC | Age: 60
End: 2020-04-23

## 2020-04-23 VITALS
WEIGHT: 224 LBS | TEMPERATURE: 98.3 F | DIASTOLIC BLOOD PRESSURE: 82 MMHG | BODY MASS INDEX: 33.95 KG/M2 | OXYGEN SATURATION: 97 % | HEART RATE: 67 BPM | SYSTOLIC BLOOD PRESSURE: 138 MMHG | HEIGHT: 68 IN | RESPIRATION RATE: 20 BRPM

## 2020-04-23 DIAGNOSIS — Z00.00 PHYSICAL EXAM: Primary | ICD-10-CM

## 2020-04-23 DIAGNOSIS — Z12.5 PROSTATE CANCER SCREENING: ICD-10-CM

## 2020-04-23 LAB
BILIRUB BLD-MCNC: NEGATIVE MG/DL
CLARITY, POC: CLEAR
COLOR UR: YELLOW
EXPIRATION DATE: ABNORMAL
GLUCOSE UR STRIP-MCNC: NEGATIVE MG/DL
KETONES UR QL: ABNORMAL
LEUKOCYTE EST, POC: NEGATIVE
Lab: ABNORMAL
NITRITE UR-MCNC: NEGATIVE MG/ML
PH UR: 6 [PH] (ref 5–8)
PROT UR STRIP-MCNC: NEGATIVE MG/DL
RBC # UR STRIP: NEGATIVE /UL
SP GR UR: 1.02 (ref 1–1.03)
UROBILINOGEN UR QL: NORMAL

## 2020-04-23 PROCEDURE — 80061 LIPID PANEL: CPT | Performed by: INTERNAL MEDICINE

## 2020-04-23 PROCEDURE — 81003 URINALYSIS AUTO W/O SCOPE: CPT | Performed by: INTERNAL MEDICINE

## 2020-04-23 PROCEDURE — 85025 COMPLETE CBC W/AUTO DIFF WBC: CPT | Performed by: INTERNAL MEDICINE

## 2020-04-23 PROCEDURE — 84443 ASSAY THYROID STIM HORMONE: CPT | Performed by: INTERNAL MEDICINE

## 2020-04-23 PROCEDURE — 80053 COMPREHEN METABOLIC PANEL: CPT | Performed by: INTERNAL MEDICINE

## 2020-04-23 PROCEDURE — 36415 COLL VENOUS BLD VENIPUNCTURE: CPT | Performed by: INTERNAL MEDICINE

## 2020-04-23 PROCEDURE — G0103 PSA SCREENING: HCPCS | Performed by: INTERNAL MEDICINE

## 2020-04-23 PROCEDURE — 99396 PREV VISIT EST AGE 40-64: CPT | Performed by: INTERNAL MEDICINE

## 2020-04-23 NOTE — PROGRESS NOTES
Chief Complaint   Patient presents with   • Annual Exam       History of Present Illness    The patient presents for an established patient physical examination and health maintenance visit.    Review of Systems    CONSTITUTIONAL- Denies Unexplained Weight Loss, Fever, Chills, Sweats, Fatigue, Weakness or Malaise.    NECK- Denies Decreased Range of Motion, Stiffness, Thyroid Nodules, Enlarged Lymph Nodes or Goiter.    EYES- Denies Eye Pain, Eye Drainage, Eye Redness, Eye Discharge, Decreased Vision, Visual Disturbance, Diplopia, Visual Loss or Swollen Eyelid.    HENT- Denies Nasal Discharge, Sore Throat, Ear Pain, Ear Drainage, Headache, Sinus Pain, Nasal Congestion, Decreased Hearing or Tinnitus.    PULMONARY- Denies Wheezing, Dyspnea, Sputum Production, Cough, Hemoptysis or Pleuritic Chest Pain.    GASTROINTESTINAL- Denies Abdominal Pain, Nausea, Vomiting, Diarrhea, Blood per Rectum, Constipation or Heartburn.    GENITOURINARY- Denies Penile Discharge, Erectile Dysfunction, Testicular Mass, Testicular Pain, Hernia, Nocturia, Decreased Urine Stream, Incomplete Voiding, Urinary Frequency, Urinary Urgency, Dysuria or Hematuria.    CARDIOVASCULAR- Denies Chest Pain, Claudication, Edema, Syncope, Palpitations or Irregular Heart Beat.    ENDOCRINE- Denies Cold Intolerance, Depression, Hair Loss, Heat Intolerance, Memory Loss, Polydypsia, Polyphagia, Polyuria, Sleep Disturbance or Weight Gain.    NEUROLOGIC- Denies Seizures, Visual Changes, Confusion or Excessive Daytime Sleepiness.    MUSCULOSKELETAL- Denies Joint Pain, Joint Stiffness, Decreased Range of Motion, Joint Swelling or Erythema of Joints.    INTEGUMENTARY- Denies Rash, Lumps, Sores, Itching, Dryness, Color Change, Changes in Hair, Brittle Nails, Discolored Nails, Thickened Nails, Growths or Alopecia.    Medications    No current outpatient medications on file.     Allergies    No Known Allergies    Problem List    Patient Active Problem List   Diagnosis   •  "Enlarged prostate without lower urinary tract symptoms (luts)       Medications, Allergies, Problems List and Past History were reviewed and updated.    Physical Examination    /82 (BP Location: Right arm, Patient Position: Sitting)   Pulse 67   Temp 98.3 °F (36.8 °C) (Temporal)   Resp 20   Ht 172.1 cm (67.75\")   Wt 102 kg (224 lb)   SpO2 97%   BMI 34.31 kg/m²     HEENT: Head- Normocephalic Atraumatic. Facies- Within normal limits. Pinnas- Normal texture and shape bilaterally. Canals- Normal bilaterally. TMs- Normal bilaterally. Nares- Patent bilaterally. Nasal Septum- is normal. There is no tenderness to palpation over the frontal or maxillary sinuses. Lids- Normal bilaterally. Conjunctiva- Clear bilaterally. Sclera- Anicteric bilaterally. Oropharynx- Moist with no lesions. Tonsils- No enlargement, erythema or exudate.    Neck: Thyroid- non enlarged, symmetric and has no nodules. No bruits are detected. ROM- Normal Range of Motion with no rigidity.    Lungs: Auscultation- Clear to auscultation bilaterally. There are no retractions, clubbing or cyanosis. The Expiratory to Inspiratory ratio is equal.    Lymph Nodes: Cervical- no enlarged lymph nodes noted. Clavicular- no enlarged supraclavicular lymph nodes noted. Axillary- no enlarged axillary lymph nodes noted. Inguinal- no enlarged inguinal lymph nodes noted.    Cardiovascular: There are no carotid bruits. Heart- Normal Rate with Regular rhythm and no murmurs. There are no gallops. There are no rubs. In the lower extremities there is no edema. The upper extremities do not have edema.    Abdomen: Soft, benign, non-tender with no masses, hernias, organomegaly or scars.    Male Genitourinary: The penis is uncircumcised with testicles found in the scrotum bilaterally. Testicular Size is normal bilaterally. The penis has no anatomic abnormalities.    Rectal: reveals normal external sphincter tone with no external lesions.    Prostate: The prostate gland is " symmetric and smooth with no nodularity.    Dermatologic: The patient has no worrisome or suspicious skin lesions noted.    Impression and Assessment    Normal Physical Examination.    Encounter for Screening for Malignant Neoplasm of the Prostate.    Plan    Counseling was provided regarding: Adequate Aerobic Exercise, Dental Visits, Flossing Teeth and Heart Healthy Diet.    The following was ordered for screening and health maintenance: Cardiac Calcium CT Scan, CBC w Automated Diff, CMP, Lipid Profile, PSA, TSH and U/A.       Immunizations Ordered and Administered: None.    Micah was seen today for annual exam.    Diagnoses and all orders for this visit:    Physical exam  -     Comprehensive Metabolic Panel  -     Lipid Panel  -     TSH  -     CBC & Differential  -     POC Urinalysis Dipstick, Automated  -     CT Cardiac Calcium Score Without Dye; Future    Prostate cancer screening  -     PSA Screen        Return to Office    The patient was instructed to return for follow-up in 1 year. Next Visit: Physical.    The patient was instructed to return sooner if the condition changes, worsens, or does not resolve.

## 2020-04-24 LAB
ALBUMIN SERPL-MCNC: 4.3 G/DL (ref 3.5–5.2)
ALBUMIN/GLOB SERPL: 1.3 G/DL
ALP SERPL-CCNC: 62 U/L (ref 39–117)
ALT SERPL W P-5'-P-CCNC: 18 U/L (ref 1–41)
ANION GAP SERPL CALCULATED.3IONS-SCNC: 14.4 MMOL/L (ref 5–15)
AST SERPL-CCNC: 19 U/L (ref 1–40)
BASOPHILS # BLD AUTO: 0.03 10*3/MM3 (ref 0–0.2)
BASOPHILS NFR BLD AUTO: 0.5 % (ref 0–1.5)
BILIRUB SERPL-MCNC: 0.6 MG/DL (ref 0.2–1.2)
BUN BLD-MCNC: 16 MG/DL (ref 6–20)
BUN/CREAT SERPL: 18.6 (ref 7–25)
CALCIUM SPEC-SCNC: 9.3 MG/DL (ref 8.6–10.5)
CHLORIDE SERPL-SCNC: 104 MMOL/L (ref 98–107)
CHOLEST SERPL-MCNC: 134 MG/DL (ref 0–200)
CO2 SERPL-SCNC: 23.6 MMOL/L (ref 22–29)
CREAT BLD-MCNC: 0.86 MG/DL (ref 0.76–1.27)
DEPRECATED RDW RBC AUTO: 43.6 FL (ref 37–54)
EOSINOPHIL # BLD AUTO: 0.37 10*3/MM3 (ref 0–0.4)
EOSINOPHIL NFR BLD AUTO: 6.2 % (ref 0.3–6.2)
ERYTHROCYTE [DISTWIDTH] IN BLOOD BY AUTOMATED COUNT: 13.3 % (ref 12.3–15.4)
GFR SERPL CREATININE-BSD FRML MDRD: 91 ML/MIN/1.73
GLOBULIN UR ELPH-MCNC: 3.3 GM/DL
GLUCOSE BLD-MCNC: 87 MG/DL (ref 65–99)
HCT VFR BLD AUTO: 45.2 % (ref 37.5–51)
HDLC SERPL-MCNC: 52 MG/DL (ref 40–60)
HGB BLD-MCNC: 15.5 G/DL (ref 13–17.7)
IMM GRANULOCYTES # BLD AUTO: 0.01 10*3/MM3 (ref 0–0.05)
IMM GRANULOCYTES NFR BLD AUTO: 0.2 % (ref 0–0.5)
LDLC SERPL CALC-MCNC: 68 MG/DL (ref 0–100)
LDLC/HDLC SERPL: 1.31 {RATIO}
LYMPHOCYTES # BLD AUTO: 2.27 10*3/MM3 (ref 0.7–3.1)
LYMPHOCYTES NFR BLD AUTO: 38.2 % (ref 19.6–45.3)
MCH RBC QN AUTO: 30.7 PG (ref 26.6–33)
MCHC RBC AUTO-ENTMCNC: 34.3 G/DL (ref 31.5–35.7)
MCV RBC AUTO: 89.5 FL (ref 79–97)
MONOCYTES # BLD AUTO: 0.42 10*3/MM3 (ref 0.1–0.9)
MONOCYTES NFR BLD AUTO: 7.1 % (ref 5–12)
NEUTROPHILS # BLD AUTO: 2.84 10*3/MM3 (ref 1.7–7)
NEUTROPHILS NFR BLD AUTO: 47.8 % (ref 42.7–76)
NRBC BLD AUTO-RTO: 0 /100 WBC (ref 0–0.2)
PLATELET # BLD AUTO: 179 10*3/MM3 (ref 140–450)
PMV BLD AUTO: 11.9 FL (ref 6–12)
POTASSIUM BLD-SCNC: 4.4 MMOL/L (ref 3.5–5.2)
PROT SERPL-MCNC: 7.6 G/DL (ref 6–8.5)
PSA SERPL-MCNC: 0.78 NG/ML (ref 0–4)
RBC # BLD AUTO: 5.05 10*6/MM3 (ref 4.14–5.8)
SODIUM BLD-SCNC: 142 MMOL/L (ref 136–145)
TRIGL SERPL-MCNC: 69 MG/DL (ref 0–150)
TSH SERPL DL<=0.05 MIU/L-ACNC: 1.87 UIU/ML (ref 0.27–4.2)
VLDLC SERPL-MCNC: 13.8 MG/DL (ref 5–40)
WBC NRBC COR # BLD: 5.94 10*3/MM3 (ref 3.4–10.8)

## 2021-04-27 ENCOUNTER — OFFICE VISIT (OUTPATIENT)
Dept: INTERNAL MEDICINE | Facility: CLINIC | Age: 61
End: 2021-04-27

## 2021-04-27 VITALS
HEIGHT: 68 IN | TEMPERATURE: 97.5 F | BODY MASS INDEX: 34.25 KG/M2 | RESPIRATION RATE: 16 BRPM | HEART RATE: 64 BPM | DIASTOLIC BLOOD PRESSURE: 82 MMHG | WEIGHT: 226 LBS | SYSTOLIC BLOOD PRESSURE: 132 MMHG

## 2021-04-27 DIAGNOSIS — Z00.00 PHYSICAL EXAM: Primary | ICD-10-CM

## 2021-04-27 DIAGNOSIS — Z12.5 PROSTATE CANCER SCREENING: ICD-10-CM

## 2021-04-27 DIAGNOSIS — R31.9 HEMATURIA, UNSPECIFIED TYPE: ICD-10-CM

## 2021-04-27 LAB
ALBUMIN SERPL-MCNC: 4.5 G/DL (ref 3.5–5.2)
ALBUMIN/GLOB SERPL: 1.5 G/DL
ALP SERPL-CCNC: 68 U/L (ref 39–117)
ALT SERPL W P-5'-P-CCNC: 20 U/L (ref 1–41)
ANION GAP SERPL CALCULATED.3IONS-SCNC: 11.8 MMOL/L (ref 5–15)
AST SERPL-CCNC: 20 U/L (ref 1–40)
BACTERIA UR QL AUTO: ABNORMAL /HPF
BASOPHILS # BLD AUTO: 0.04 10*3/MM3 (ref 0–0.2)
BASOPHILS NFR BLD AUTO: 0.7 % (ref 0–1.5)
BILIRUB BLD-MCNC: ABNORMAL MG/DL
BILIRUB SERPL-MCNC: 0.6 MG/DL (ref 0–1.2)
BUN SERPL-MCNC: 18 MG/DL (ref 6–20)
BUN/CREAT SERPL: 17.1 (ref 7–25)
CALCIUM SPEC-SCNC: 9.2 MG/DL (ref 8.6–10.5)
CHLORIDE SERPL-SCNC: 105 MMOL/L (ref 98–107)
CHOLEST SERPL-MCNC: 134 MG/DL (ref 0–200)
CLARITY, POC: CLEAR
CO2 SERPL-SCNC: 24.2 MMOL/L (ref 22–29)
COLOR UR: ABNORMAL
CREAT SERPL-MCNC: 1.05 MG/DL (ref 0.76–1.27)
DEPRECATED RDW RBC AUTO: 42.4 FL (ref 37–54)
EOSINOPHIL # BLD AUTO: 0.39 10*3/MM3 (ref 0–0.4)
EOSINOPHIL NFR BLD AUTO: 6.7 % (ref 0.3–6.2)
ERYTHROCYTE [DISTWIDTH] IN BLOOD BY AUTOMATED COUNT: 13.2 % (ref 12.3–15.4)
EXPIRATION DATE: ABNORMAL
GFR SERPL CREATININE-BSD FRML MDRD: 72 ML/MIN/1.73
GLOBULIN UR ELPH-MCNC: 3.1 GM/DL
GLUCOSE SERPL-MCNC: 99 MG/DL (ref 65–99)
GLUCOSE UR STRIP-MCNC: NEGATIVE MG/DL
HCT VFR BLD AUTO: 46 % (ref 37.5–51)
HDLC SERPL-MCNC: 54 MG/DL (ref 40–60)
HGB BLD-MCNC: 15.6 G/DL (ref 13–17.7)
HYALINE CASTS UR QL AUTO: ABNORMAL /LPF
IMM GRANULOCYTES # BLD AUTO: 0.01 10*3/MM3 (ref 0–0.05)
IMM GRANULOCYTES NFR BLD AUTO: 0.2 % (ref 0–0.5)
KETONES UR QL: NEGATIVE
LDLC SERPL CALC-MCNC: 62 MG/DL (ref 0–100)
LDLC/HDLC SERPL: 1.11 {RATIO}
LEUKOCYTE EST, POC: NEGATIVE
LYMPHOCYTES # BLD AUTO: 2.18 10*3/MM3 (ref 0.7–3.1)
LYMPHOCYTES NFR BLD AUTO: 37.3 % (ref 19.6–45.3)
Lab: ABNORMAL
MCH RBC QN AUTO: 29.8 PG (ref 26.6–33)
MCHC RBC AUTO-ENTMCNC: 33.9 G/DL (ref 31.5–35.7)
MCV RBC AUTO: 87.8 FL (ref 79–97)
MONOCYTES # BLD AUTO: 0.37 10*3/MM3 (ref 0.1–0.9)
MONOCYTES NFR BLD AUTO: 6.3 % (ref 5–12)
NEUTROPHILS NFR BLD AUTO: 2.86 10*3/MM3 (ref 1.7–7)
NEUTROPHILS NFR BLD AUTO: 48.8 % (ref 42.7–76)
NITRITE UR-MCNC: NEGATIVE MG/ML
NRBC BLD AUTO-RTO: 0.2 /100 WBC (ref 0–0.2)
PH UR: 5 [PH] (ref 5–8)
PLATELET # BLD AUTO: 196 10*3/MM3 (ref 140–450)
PMV BLD AUTO: 11.6 FL (ref 6–12)
POTASSIUM SERPL-SCNC: 4 MMOL/L (ref 3.5–5.2)
PROT SERPL-MCNC: 7.6 G/DL (ref 6–8.5)
PROT UR STRIP-MCNC: NEGATIVE MG/DL
PSA SERPL-MCNC: 0.81 NG/ML (ref 0–4)
RBC # BLD AUTO: 5.24 10*6/MM3 (ref 4.14–5.8)
RBC # UR STRIP: ABNORMAL /UL
RBC # UR: ABNORMAL /HPF
REF LAB TEST METHOD: ABNORMAL
SODIUM SERPL-SCNC: 141 MMOL/L (ref 136–145)
SP GR UR: 1.02 (ref 1–1.03)
SQUAMOUS #/AREA URNS HPF: ABNORMAL /HPF
TRIGL SERPL-MCNC: 99 MG/DL (ref 0–150)
TSH SERPL DL<=0.05 MIU/L-ACNC: 2.62 UIU/ML (ref 0.27–4.2)
UROBILINOGEN UR QL: ABNORMAL
VLDLC SERPL-MCNC: 18 MG/DL (ref 5–40)
WBC # BLD AUTO: 5.85 10*3/MM3 (ref 3.4–10.8)
WBC UR QL AUTO: ABNORMAL /HPF

## 2021-04-27 PROCEDURE — 81003 URINALYSIS AUTO W/O SCOPE: CPT | Performed by: INTERNAL MEDICINE

## 2021-04-27 PROCEDURE — 80061 LIPID PANEL: CPT | Performed by: INTERNAL MEDICINE

## 2021-04-27 PROCEDURE — 99396 PREV VISIT EST AGE 40-64: CPT | Performed by: INTERNAL MEDICINE

## 2021-04-27 PROCEDURE — 80053 COMPREHEN METABOLIC PANEL: CPT | Performed by: INTERNAL MEDICINE

## 2021-04-27 PROCEDURE — 81015 MICROSCOPIC EXAM OF URINE: CPT | Performed by: INTERNAL MEDICINE

## 2021-04-27 PROCEDURE — 84443 ASSAY THYROID STIM HORMONE: CPT | Performed by: INTERNAL MEDICINE

## 2021-04-27 PROCEDURE — G0103 PSA SCREENING: HCPCS | Performed by: INTERNAL MEDICINE

## 2021-04-27 PROCEDURE — 36415 COLL VENOUS BLD VENIPUNCTURE: CPT | Performed by: INTERNAL MEDICINE

## 2021-04-27 PROCEDURE — 85025 COMPLETE CBC W/AUTO DIFF WBC: CPT | Performed by: INTERNAL MEDICINE

## 2021-04-27 NOTE — PROGRESS NOTES
Chief Complaint   Patient presents with   • Annual Exam       History of Present Illness    The patient presents for an established patient physical examination and health maintenance visit.    Review of Systems    CONSTITUTIONAL- Denies Unexplained Weight Loss, Fever, Chills, Sweats, Fatigue, Weakness or Malaise.    NECK- Denies Decreased Range of Motion, Stiffness, Thyroid Nodules, Enlarged Lymph Nodes or Goiter.    EYES- Denies Eye Pain, Eye Drainage, Eye Redness, Eye Discharge, Decreased Vision, Visual Disturbance, Diplopia, Visual Loss or Swollen Eyelid.    HENT- Denies Nasal Discharge, Sore Throat, Ear Pain, Ear Drainage, Headache, Sinus Pain, Nasal Congestion, Decreased Hearing or Tinnitus.    PULMONARY- Denies Wheezing, Dyspnea, Sputum Production, Cough, Hemoptysis or Pleuritic Chest Pain.    GASTROINTESTINAL- Denies Abdominal Pain, Nausea, Vomiting, Diarrhea, Blood per Rectum, Constipation or Heartburn.    GENITOURINARY- Denies Penile Discharge, Erectile Dysfunction, Testicular Mass, Testicular Pain, Hernia, Nocturia, Decreased Urine Stream, Incomplete Voiding, Urinary Frequency, Urinary Urgency, Dysuria or Hematuria.    CARDIOVASCULAR- Denies Chest Pain, Claudication, Edema, Syncope, Palpitations or Irregular Heart Beat.    ENDOCRINE- Denies Cold Intolerance, Depression, Hair Loss, Heat Intolerance, Memory Loss, Polydypsia, Polyphagia, Polyuria, Sleep Disturbance or Weight Gain.    NEUROLOGIC- Denies Seizures, Visual Changes, Confusion or Excessive Daytime Sleepiness.    MUSCULOSKELETAL- Denies Joint Pain, Joint Stiffness, Decreased Range of Motion, Joint Swelling or Erythema of Joints.    INTEGUMENTARY- Denies Rash, Lumps, Sores, Itching, Dryness, Color Change, Changes in Hair, Brittle Nails, Discolored Nails, Thickened Nails, Growths or Alopecia.    Medications    No current outpatient medications on file.     Allergies    No Known Allergies    Problem List    Patient Active Problem List   Diagnosis   •  "Enlarged prostate without lower urinary tract symptoms (luts)       Medications, Allergies, Problems List and Past History were reviewed and updated.    Physical Examination    /82   Pulse 64   Temp 97.5 °F (36.4 °C) (Infrared)   Resp 16   Ht 172.7 cm (68\")   Wt 103 kg (226 lb)   BMI 34.36 kg/m²     HEENT: Head- Normocephalic Atraumatic. Facies- Within normal limits. Pinnas- Normal texture and shape bilaterally. Canals- Normal bilaterally. TMs- Normal bilaterally. Nares- Patent bilaterally. Nasal Septum- is normal. There is no tenderness to palpation over the frontal or maxillary sinuses. Lids- Normal bilaterally. Conjunctiva- Clear bilaterally. Sclera- Anicteric bilaterally. Oropharynx- Moist with no lesions. Tonsils- No enlargement, erythema or exudate.    Neck: Thyroid- non enlarged, symmetric and has no nodules. No bruits are detected. ROM- Normal Range of Motion with no rigidity.    Lungs: Auscultation- Clear to auscultation bilaterally. There are no retractions, clubbing or cyanosis. The Expiratory to Inspiratory ratio is equal.    Lymph Nodes: Cervical- no enlarged lymph nodes noted. Clavicular- no enlarged supraclavicular lymph nodes noted. Axillary- no enlarged axillary lymph nodes noted. Inguinal- no enlarged inguinal lymph nodes noted.    Cardiovascular: There are no carotid bruits. Heart- Normal Rate with Regular rhythm and no murmurs. There are no gallops. There are no rubs. In the lower extremities there is no edema. The upper extremities do not have edema.    Abdomen: Soft, benign, non-tender with no masses, hernias, organomegaly or scars.    Male Genitourinary: The penis is uncircumcised with testicles found in the scrotum bilaterally. Testicular Size is normal bilaterally. The penis has no anatomic abnormalities.    Rectal: reveals normal external sphincter tone with no external lesions.    Prostate: The prostate gland is symmetric and smooth with no nodularity.    Dermatologic: The " patient has no worrisome or suspicious skin lesions noted.    Impression and Assessment    Normal Physical Examination.    Encounter for Screening for Malignant Neoplasm of the Prostate.    Plan    Counseling was provided regarding: Adequate Aerobic Exercise, Dental Visits, Flossing Teeth and Heart Healthy Diet.    The following was ordered for screening and health maintenance: CBC w Automated Diff, CMP, Lipid Profile, PSA, TSH and U/A.       Immunizations Ordered and Administered: None.    Diagnoses and all orders for this visit:    1. Physical exam (Primary)  -     Comprehensive Metabolic Panel  -     Lipid Panel  -     TSH  -     CBC & Differential  -     POC Urinalysis Dipstick, Automated    2. Prostate cancer screening  -     PSA Screen        Return to Office    The patient was instructed to return for follow-up in 1 year. Next Visit: Physical Examination. The patient was instructed to return sooner if the condition changes, worsens, or does not resolve.

## 2022-05-10 ENCOUNTER — LAB (OUTPATIENT)
Dept: LAB | Facility: HOSPITAL | Age: 62
End: 2022-05-10

## 2022-05-10 ENCOUNTER — OFFICE VISIT (OUTPATIENT)
Dept: INTERNAL MEDICINE | Facility: CLINIC | Age: 62
End: 2022-05-10

## 2022-05-10 VITALS
HEART RATE: 72 BPM | SYSTOLIC BLOOD PRESSURE: 126 MMHG | RESPIRATION RATE: 16 BRPM | DIASTOLIC BLOOD PRESSURE: 78 MMHG | HEIGHT: 68 IN | TEMPERATURE: 98.2 F | WEIGHT: 237.8 LBS | BODY MASS INDEX: 36.04 KG/M2

## 2022-05-10 DIAGNOSIS — Z13.220 SCREENING FOR LIPID DISORDERS: ICD-10-CM

## 2022-05-10 DIAGNOSIS — R31.9 HEMATURIA, UNSPECIFIED TYPE: ICD-10-CM

## 2022-05-10 DIAGNOSIS — Z00.00 PHYSICAL EXAM: Primary | ICD-10-CM

## 2022-05-10 DIAGNOSIS — Z23 IMMUNIZATION DUE: ICD-10-CM

## 2022-05-10 DIAGNOSIS — Z12.5 PROSTATE CANCER SCREENING: ICD-10-CM

## 2022-05-10 LAB
BASOPHILS # BLD AUTO: 0.03 10*3/MM3 (ref 0–0.2)
BASOPHILS NFR BLD AUTO: 0.5 % (ref 0–1.5)
BILIRUB BLD-MCNC: NEGATIVE MG/DL
CLARITY, POC: CLEAR
COLOR UR: YELLOW
DEPRECATED RDW RBC AUTO: 42.5 FL (ref 37–54)
EOSINOPHIL # BLD AUTO: 0.43 10*3/MM3 (ref 0–0.4)
EOSINOPHIL NFR BLD AUTO: 7.1 % (ref 0.3–6.2)
ERYTHROCYTE [DISTWIDTH] IN BLOOD BY AUTOMATED COUNT: 13.2 % (ref 12.3–15.4)
EXPIRATION DATE: ABNORMAL
GLUCOSE UR STRIP-MCNC: NEGATIVE MG/DL
HCT VFR BLD AUTO: 44.8 % (ref 37.5–51)
HGB BLD-MCNC: 15.4 G/DL (ref 13–17.7)
IMM GRANULOCYTES # BLD AUTO: 0.02 10*3/MM3 (ref 0–0.05)
IMM GRANULOCYTES NFR BLD AUTO: 0.3 % (ref 0–0.5)
KETONES UR QL: NEGATIVE
LEUKOCYTE EST, POC: NEGATIVE
LYMPHOCYTES # BLD AUTO: 2.24 10*3/MM3 (ref 0.7–3.1)
LYMPHOCYTES NFR BLD AUTO: 37.2 % (ref 19.6–45.3)
Lab: ABNORMAL
MCH RBC QN AUTO: 30.6 PG (ref 26.6–33)
MCHC RBC AUTO-ENTMCNC: 34.4 G/DL (ref 31.5–35.7)
MCV RBC AUTO: 88.9 FL (ref 79–97)
MONOCYTES # BLD AUTO: 0.42 10*3/MM3 (ref 0.1–0.9)
MONOCYTES NFR BLD AUTO: 7 % (ref 5–12)
NEUTROPHILS NFR BLD AUTO: 2.88 10*3/MM3 (ref 1.7–7)
NEUTROPHILS NFR BLD AUTO: 47.9 % (ref 42.7–76)
NITRITE UR-MCNC: NEGATIVE MG/ML
NRBC BLD AUTO-RTO: 0 /100 WBC (ref 0–0.2)
PH UR: 5 [PH] (ref 5–8)
PLATELET # BLD AUTO: 198 10*3/MM3 (ref 140–450)
PMV BLD AUTO: 11 FL (ref 6–12)
PROT UR STRIP-MCNC: NEGATIVE MG/DL
RBC # BLD AUTO: 5.04 10*6/MM3 (ref 4.14–5.8)
RBC # UR STRIP: ABNORMAL /UL
SP GR UR: 1.02 (ref 1–1.03)
UROBILINOGEN UR QL: NORMAL
WBC NRBC COR # BLD: 6.02 10*3/MM3 (ref 3.4–10.8)

## 2022-05-10 PROCEDURE — 80050 GENERAL HEALTH PANEL: CPT | Performed by: INTERNAL MEDICINE

## 2022-05-10 PROCEDURE — 99396 PREV VISIT EST AGE 40-64: CPT | Performed by: INTERNAL MEDICINE

## 2022-05-10 PROCEDURE — G0103 PSA SCREENING: HCPCS | Performed by: INTERNAL MEDICINE

## 2022-05-10 PROCEDURE — 80061 LIPID PANEL: CPT | Performed by: INTERNAL MEDICINE

## 2022-05-10 PROCEDURE — 81015 MICROSCOPIC EXAM OF URINE: CPT | Performed by: INTERNAL MEDICINE

## 2022-05-10 PROCEDURE — 81003 URINALYSIS AUTO W/O SCOPE: CPT | Performed by: INTERNAL MEDICINE

## 2022-05-10 PROCEDURE — 91305 COVID-19 (PFIZER) 12+ YRS: CPT | Performed by: INTERNAL MEDICINE

## 2022-05-10 PROCEDURE — 0054A COVID-19 (PFIZER) 12+ YRS: CPT | Performed by: INTERNAL MEDICINE

## 2022-05-10 RX ORDER — SILDENAFIL 100 MG/1
100 TABLET, FILM COATED ORAL DAILY PRN
Qty: 30 TABLET | Refills: 0 | Status: SHIPPED | OUTPATIENT
Start: 2022-05-10

## 2022-05-10 NOTE — PROGRESS NOTES
Chief Complaint   Patient presents with   • Annual Exam       History of Present Illness    The patient presents for an established patient physical examination and health maintenance visit.    Review of Systems    CONSTITUTIONAL- Denies Unexplained Weight Loss, Fever, Chills, Sweats, Fatigue, Weakness or Malaise.    NECK- Denies Decreased Range of Motion, Stiffness, Thyroid Nodules, Enlarged Lymph Nodes or Goiter.    EYES- Denies Eye Pain, Eye Drainage, Eye Redness, Eye Discharge, Decreased Vision, Visual Disturbance, Diplopia, Visual Loss or Swollen Eyelid.    HENT- Denies Nasal Discharge, Sore Throat, Ear Pain, Ear Drainage, Headache, Sinus Pain, Nasal Congestion, Decreased Hearing or Tinnitus.    PULMONARY- Denies Wheezing, Dyspnea, Sputum Production, Cough, Hemoptysis or Pleuritic Chest Pain.    GASTROINTESTINAL- Denies Abdominal Pain, Nausea, Vomiting, Diarrhea, Blood per Rectum, Constipation or Heartburn.    GENITOURINARY- Denies Penile Discharge, Erectile Dysfunction, Testicular Mass, Testicular Pain, Hernia, Nocturia, Decreased Urine Stream, Incomplete Voiding, Urinary Frequency, Urinary Urgency, Dysuria or Hematuria.    CARDIOVASCULAR- Denies Chest Pain, Claudication, Edema, Syncope, Palpitations or Irregular Heart Beat.    ENDOCRINE- Denies Cold Intolerance, Depression, Hair Loss, Heat Intolerance, Memory Loss, Polydypsia, Polyphagia, Polyuria, Sleep Disturbance or Weight Gain.    NEUROLOGIC- Denies Seizures, Visual Changes, Confusion or Excessive Daytime Sleepiness.    MUSCULOSKELETAL- Denies Joint Pain, Joint Stiffness, Decreased Range of Motion, Joint Swelling or Erythema of Joints.    INTEGUMENTARY- Denies Rash, Lumps, Sores, Itching, Dryness, Color Change, Changes in Hair, Brittle Nails, Discolored Nails, Thickened Nails, Growths or Alopecia.    Medications      Current Outpatient Medications:   •  sildenafil (Viagra) 100 MG tablet, Take 1 tablet by mouth Daily As Needed for Erectile Dysfunction.,  "Disp: 30 tablet, Rfl: 0     Allergies    No Known Allergies    Problem List    Patient Active Problem List   Diagnosis   • Enlarged prostate without lower urinary tract symptoms (luts)       Medications, Allergies, Problems List and Past History were reviewed and updated.    Physical Examination    /78 (BP Location: Left arm, Patient Position: Sitting, Cuff Size: Adult)   Pulse 72   Temp 98.2 °F (36.8 °C) (Infrared)   Resp 16   Ht 172.7 cm (68\")   Wt 108 kg (237 lb 12.8 oz)   BMI 36.16 kg/m²     HEENT: Head- Normocephalic Atraumatic. Facies- Within normal limits. Pinnas- Normal texture and shape bilaterally. Canals- Normal bilaterally. TMs- Normal bilaterally. Nares- Patent bilaterally. Nasal Septum- is normal. There is no tenderness to palpation over the frontal or maxillary sinuses. Lids- Normal bilaterally. Conjunctiva- Clear bilaterally. Sclera- Anicteric bilaterally. Oropharynx- Moist with no lesions. Tonsils- No enlargement, erythema or exudate.    Neck: Thyroid- non enlarged, symmetric and has no nodules. No bruits are detected. ROM- Normal Range of Motion with no rigidity.    Lungs: Auscultation- Clear to auscultation bilaterally. There are no retractions, clubbing or cyanosis. The Expiratory to Inspiratory ratio is equal.    Lymph Nodes: Cervical- no enlarged lymph nodes noted. Clavicular- no enlarged supraclavicular lymph nodes noted. Axillary- no enlarged axillary lymph nodes noted. Inguinal- no enlarged inguinal lymph nodes noted.    Cardiovascular: There are no carotid bruits. Heart- Normal Rate with Regular rhythm and no murmurs. There are no gallops. There are no rubs. In the lower extremities there is no edema. The upper extremities do not have edema.    Abdomen: Soft, benign, non-tender with no masses, hernias, organomegaly or scars.    Male Genitourinary: The penis is uncircumcised with testicles found in the scrotum bilaterally. Testicular Size is normal bilaterally. The penis has no " anatomic abnormalities.    Rectal: reveals normal external sphincter tone with no external lesions.    Prostate: The prostate gland is symmetric and smooth with no nodularity.    Dermatologic: The patient has no worrisome or suspicious skin lesions noted.    Impression and Assessment    Normal Physical Examination.    Encounter for Screening for Malignant Neoplasm of the Prostate.    Encounter for Immunization Administration.    Plan    Counseling was provided regarding: Adequate Aerobic Exercise, Dental Visits, Flossing Teeth, Heart Healthy Diet and Weight Lose.    The following was ordered for screening and health maintenance: CBC w Automated Diff, CMP, Lipid Profile, PSA, TSH and U/A.    Counseled regarding immunizations and applicable VIS given.    Immunizations Ordered and Administered: Covid-19.    Diagnoses and all orders for this visit:    1. Physical exam (Primary)  -     CBC & Differential; Future  -     Comprehensive Metabolic Panel; Future  -     TSH; Future  -     POC Urinalysis Dipstick, Automated; Future    2. Screening for lipid disorders  -     Lipid Panel; Future    3. Prostate cancer screening  -     PSA Screen; Future    Other orders  -     sildenafil (Viagra) 100 MG tablet; Take 1 tablet by mouth Daily As Needed for Erectile Dysfunction.  Dispense: 30 tablet; Refill: 0        Return to Office    The patient was instructed to return for follow-up in 1 year. The patient was instructed to return sooner if the condition changes, worsens, or does not resolve.

## 2022-05-11 LAB
ALBUMIN SERPL-MCNC: 4.3 G/DL (ref 3.5–5.2)
ALBUMIN/GLOB SERPL: 1.5 G/DL
ALP SERPL-CCNC: 67 U/L (ref 39–117)
ALT SERPL W P-5'-P-CCNC: 30 U/L (ref 1–41)
ANION GAP SERPL CALCULATED.3IONS-SCNC: 12 MMOL/L (ref 5–15)
AST SERPL-CCNC: 32 U/L (ref 1–40)
BACTERIA UR QL AUTO: NORMAL /HPF
BILIRUB SERPL-MCNC: 0.3 MG/DL (ref 0–1.2)
BUN SERPL-MCNC: 16 MG/DL (ref 8–23)
BUN/CREAT SERPL: 12.5 (ref 7–25)
CALCIUM SPEC-SCNC: 9.2 MG/DL (ref 8.6–10.5)
CHLORIDE SERPL-SCNC: 105 MMOL/L (ref 98–107)
CHOLEST SERPL-MCNC: 152 MG/DL (ref 0–200)
CO2 SERPL-SCNC: 24 MMOL/L (ref 22–29)
CREAT SERPL-MCNC: 1.28 MG/DL (ref 0.76–1.27)
EGFRCR SERPLBLD CKD-EPI 2021: 64.1 ML/MIN/1.73
GLOBULIN UR ELPH-MCNC: 2.9 GM/DL
GLUCOSE SERPL-MCNC: 120 MG/DL (ref 65–99)
HDLC SERPL-MCNC: 55 MG/DL (ref 40–60)
HYALINE CASTS UR QL AUTO: NORMAL /LPF
LDLC SERPL CALC-MCNC: 77 MG/DL (ref 0–100)
LDLC/HDLC SERPL: 1.36 {RATIO}
POTASSIUM SERPL-SCNC: 3.9 MMOL/L (ref 3.5–5.2)
PROT SERPL-MCNC: 7.2 G/DL (ref 6–8.5)
PSA SERPL-MCNC: 0.8 NG/ML (ref 0–4)
RBC # UR STRIP: NORMAL /HPF
REF LAB TEST METHOD: NORMAL
SODIUM SERPL-SCNC: 141 MMOL/L (ref 136–145)
SQUAMOUS #/AREA URNS HPF: NORMAL /HPF
TRIGL SERPL-MCNC: 112 MG/DL (ref 0–150)
TSH SERPL DL<=0.05 MIU/L-ACNC: 1.89 UIU/ML (ref 0.27–4.2)
VLDLC SERPL-MCNC: 20 MG/DL (ref 5–40)
WBC # UR STRIP: NORMAL /HPF

## 2023-05-16 ENCOUNTER — OFFICE VISIT (OUTPATIENT)
Dept: INTERNAL MEDICINE | Facility: CLINIC | Age: 63
End: 2023-05-16
Payer: COMMERCIAL

## 2023-05-16 VITALS
SYSTOLIC BLOOD PRESSURE: 126 MMHG | WEIGHT: 240 LBS | DIASTOLIC BLOOD PRESSURE: 84 MMHG | BODY MASS INDEX: 36.37 KG/M2 | RESPIRATION RATE: 19 BRPM | TEMPERATURE: 98.2 F | HEART RATE: 84 BPM | HEIGHT: 68 IN

## 2023-05-16 DIAGNOSIS — Z12.11 SCREENING FOR COLON CANCER: ICD-10-CM

## 2023-05-16 DIAGNOSIS — R31.9 HEMATURIA, UNSPECIFIED TYPE: ICD-10-CM

## 2023-05-16 DIAGNOSIS — L98.9 SKIN LESION OF FACE: ICD-10-CM

## 2023-05-16 DIAGNOSIS — Z12.5 PROSTATE CANCER SCREENING: ICD-10-CM

## 2023-05-16 DIAGNOSIS — Z13.6 SCREENING FOR CARDIOVASCULAR CONDITION: ICD-10-CM

## 2023-05-16 DIAGNOSIS — Z00.00 PHYSICAL EXAM: Primary | ICD-10-CM

## 2023-05-16 LAB
ALBUMIN SERPL-MCNC: 4.6 G/DL (ref 3.5–5.2)
ALBUMIN/GLOB SERPL: 1.8 G/DL
ALP SERPL-CCNC: 65 U/L (ref 39–117)
ALT SERPL W P-5'-P-CCNC: 32 U/L (ref 1–41)
ANION GAP SERPL CALCULATED.3IONS-SCNC: 8 MMOL/L (ref 5–15)
AST SERPL-CCNC: 30 U/L (ref 1–40)
BACTERIA UR QL AUTO: NORMAL /HPF
BASOPHILS # BLD AUTO: 0.03 10*3/MM3 (ref 0–0.2)
BASOPHILS NFR BLD AUTO: 0.6 % (ref 0–1.5)
BILIRUB BLD-MCNC: NEGATIVE MG/DL
BILIRUB SERPL-MCNC: 0.5 MG/DL (ref 0–1.2)
BUN SERPL-MCNC: 13 MG/DL (ref 8–23)
BUN/CREAT SERPL: 13.5 (ref 7–25)
CALCIUM SPEC-SCNC: 9.1 MG/DL (ref 8.6–10.5)
CHLORIDE SERPL-SCNC: 105 MMOL/L (ref 98–107)
CHOLEST SERPL-MCNC: 132 MG/DL (ref 0–200)
CLARITY, POC: ABNORMAL
CO2 SERPL-SCNC: 25 MMOL/L (ref 22–29)
COLOR UR: YELLOW
CREAT SERPL-MCNC: 0.96 MG/DL (ref 0.76–1.27)
DEPRECATED RDW RBC AUTO: 42.3 FL (ref 37–54)
EGFRCR SERPLBLD CKD-EPI 2021: 89.9 ML/MIN/1.73
EOSINOPHIL # BLD AUTO: 0.35 10*3/MM3 (ref 0–0.4)
EOSINOPHIL NFR BLD AUTO: 7 % (ref 0.3–6.2)
ERYTHROCYTE [DISTWIDTH] IN BLOOD BY AUTOMATED COUNT: 13.1 % (ref 12.3–15.4)
EXPIRATION DATE: ABNORMAL
GLOBULIN UR ELPH-MCNC: 2.6 GM/DL
GLUCOSE SERPL-MCNC: 102 MG/DL (ref 65–99)
GLUCOSE UR STRIP-MCNC: NEGATIVE MG/DL
HCT VFR BLD AUTO: 45.9 % (ref 37.5–51)
HDLC SERPL-MCNC: 51 MG/DL (ref 40–60)
HGB BLD-MCNC: 15.4 G/DL (ref 13–17.7)
HYALINE CASTS UR QL AUTO: NORMAL /LPF
IMM GRANULOCYTES # BLD AUTO: 0.01 10*3/MM3 (ref 0–0.05)
IMM GRANULOCYTES NFR BLD AUTO: 0.2 % (ref 0–0.5)
KETONES UR QL: NEGATIVE
LDLC SERPL CALC-MCNC: 66 MG/DL (ref 0–100)
LDLC/HDLC SERPL: 1.3 {RATIO}
LEUKOCYTE EST, POC: NEGATIVE
LYMPHOCYTES # BLD AUTO: 1.97 10*3/MM3 (ref 0.7–3.1)
LYMPHOCYTES NFR BLD AUTO: 39.4 % (ref 19.6–45.3)
Lab: ABNORMAL
MCH RBC QN AUTO: 29.7 PG (ref 26.6–33)
MCHC RBC AUTO-ENTMCNC: 33.6 G/DL (ref 31.5–35.7)
MCV RBC AUTO: 88.4 FL (ref 79–97)
MONOCYTES # BLD AUTO: 0.43 10*3/MM3 (ref 0.1–0.9)
MONOCYTES NFR BLD AUTO: 8.6 % (ref 5–12)
NEUTROPHILS NFR BLD AUTO: 2.21 10*3/MM3 (ref 1.7–7)
NEUTROPHILS NFR BLD AUTO: 44.2 % (ref 42.7–76)
NITRITE UR-MCNC: NEGATIVE MG/ML
NRBC BLD AUTO-RTO: 0 /100 WBC (ref 0–0.2)
PH UR: 5 [PH] (ref 5–8)
PLATELET # BLD AUTO: 185 10*3/MM3 (ref 140–450)
PMV BLD AUTO: 11.1 FL (ref 6–12)
POTASSIUM SERPL-SCNC: 4.2 MMOL/L (ref 3.5–5.2)
PROT SERPL-MCNC: 7.2 G/DL (ref 6–8.5)
PROT UR STRIP-MCNC: NEGATIVE MG/DL
PSA SERPL-MCNC: 0.8 NG/ML (ref 0–4)
RBC # BLD AUTO: 5.19 10*6/MM3 (ref 4.14–5.8)
RBC # UR STRIP: ABNORMAL /UL
RBC # UR STRIP: NORMAL /HPF
REF LAB TEST METHOD: NORMAL
SODIUM SERPL-SCNC: 138 MMOL/L (ref 136–145)
SP GR UR: 1.01 (ref 1–1.03)
SQUAMOUS #/AREA URNS HPF: NORMAL /HPF
TRIGL SERPL-MCNC: 73 MG/DL (ref 0–150)
TSH SERPL DL<=0.05 MIU/L-ACNC: 2.57 UIU/ML (ref 0.27–4.2)
UROBILINOGEN UR QL: NORMAL
VLDLC SERPL-MCNC: 15 MG/DL (ref 5–40)
WBC # UR STRIP: NORMAL /HPF
WBC NRBC COR # BLD: 5 10*3/MM3 (ref 3.4–10.8)

## 2023-05-16 PROCEDURE — 81015 MICROSCOPIC EXAM OF URINE: CPT | Performed by: INTERNAL MEDICINE

## 2023-05-16 PROCEDURE — 36415 COLL VENOUS BLD VENIPUNCTURE: CPT | Performed by: INTERNAL MEDICINE

## 2023-05-16 PROCEDURE — G0103 PSA SCREENING: HCPCS | Performed by: INTERNAL MEDICINE

## 2023-05-16 PROCEDURE — 80061 LIPID PANEL: CPT | Performed by: INTERNAL MEDICINE

## 2023-05-16 PROCEDURE — 99396 PREV VISIT EST AGE 40-64: CPT | Performed by: INTERNAL MEDICINE

## 2023-05-16 PROCEDURE — 81003 URINALYSIS AUTO W/O SCOPE: CPT | Performed by: INTERNAL MEDICINE

## 2023-05-16 PROCEDURE — 80050 GENERAL HEALTH PANEL: CPT | Performed by: INTERNAL MEDICINE

## 2023-05-16 NOTE — PROGRESS NOTES
"Chief Complaint   Patient presents with   • Annual Exam       History of Present Illness    The patient presents for an established patient physical examination and health maintenance visit.    Medications    No current outpatient medications on file.     Allergies    No Known Allergies    Problem List    Patient Active Problem List   Diagnosis   • Enlarged prostate without lower urinary tract symptoms (luts)       Medications, Allergies, Problems List and Past History were reviewed and updated.    Physical Examination    /84   Pulse 84   Temp 98.2 °F (36.8 °C)   Resp 19   Ht 172.7 cm (68\")   Wt 109 kg (240 lb)   BMI 36.49 kg/m²     HEENT: Head- Normocephalic Atraumatic. Facies- Within normal limits. Pinnas- Normal texture and shape bilaterally. Canals- Normal bilaterally. TMs- Normal bilaterally. Nares- Patent bilaterally. Nasal Septum- is normal. There is no tenderness to palpation over the frontal or maxillary sinuses. Lids- Normal bilaterally. Conjunctiva- Clear bilaterally. Sclera- Anicteric bilaterally. Oropharynx- Moist with no lesions. Tonsils- No enlargement, erythema or exudate.    Neck: Thyroid- non enlarged, symmetric and has no nodules. No bruits are detected. ROM- Normal Range of Motion with no rigidity.    Lungs: Auscultation- Clear to auscultation bilaterally. There are no retractions, clubbing or cyanosis. The Expiratory to Inspiratory ratio is equal.    Lymph Nodes: Cervical- no enlarged lymph nodes noted. Clavicular- Deferred. Axillary- Deferred. Inguinal- Deferred.    Cardiovascular: There are no carotid bruits. Heart- Normal Rate with Regular rhythm and no murmurs. There are no gallops. There are no rubs. In the lower extremities there is no edema. The upper extremities do not have edema.    Abdomen: Soft, benign, non-tender with no masses, hernias, organomegaly or scars.    Male Genitourinary: The penis is uncircumcised with testicles found in the scrotum bilaterally. Testicular " Size is normal bilaterally. The penis has no anatomic abnormalities.    Rectal: reveals normal external sphincter tone with no external lesions.    Prostate: The prostate gland is symmetric and smooth with no nodularity.    Dermatologic: The patient has multiple scaled lesions on his face.    Impression and Assessment    Normal Physical Examination.    Encounter for Screening for Malignant Neoplasm of the Colon.    Encounter for Screening for Malignant Neoplasm of the Prostate.    Plan    Counseling was provided regarding: Adequate Aerobic Exercise, Dental Visits, Flossing Teeth, Heart Healthy Diet, Seat Belt Utilization and Weight Lose.    The following was ordered for screening and health maintenance: CBC w Automated Diff, CMP, Colonoscopy, Lipid Profile, PSA, TSH and U/A.       Immunizations Ordered and Administered: None.    Diagnoses and all orders for this visit:    1. Physical exam (Primary)  -     Comprehensive Metabolic Panel; Future  -     TSH; Future  -     CBC & Differential; Future  -     POC Urinalysis Dipstick, Automated; Future  -     Comprehensive Metabolic Panel  -     TSH  -     CBC & Differential    2. Screening for cardiovascular condition  -     Lipid Panel; Future  -     Lipid Panel    3. Prostate cancer screening  -     PSA Screen; Future  -     PSA Screen    4. Screening for colon cancer  -     Ambulatory Referral For Screening Colonoscopy    5. Skin lesion of face  -     Ambulatory Referral to Dermatology      Class 2 Severe Obesity (BMI >=35 and <=39.9). Obesity-related health conditions include the following: none. Obesity is worsening. BMI is is above average; BMI management plan is completed. We discussed low calorie, low carb based diet program, portion control and increasing exercise.      Return to Office    The patient was instructed to return for follow-up in 1 year. Next Visit: Physical Examination. The patient was instructed to return sooner if the condition changes, worsens, or  does not resolve.

## 2023-08-07 RX ORDER — SODIUM, POTASSIUM,MAG SULFATES 17.5-3.13G
SOLUTION, RECONSTITUTED, ORAL ORAL
Qty: 354 ML | Refills: 0 | Status: SHIPPED | OUTPATIENT
Start: 2023-08-07

## 2023-08-15 ENCOUNTER — OUTSIDE FACILITY SERVICE (OUTPATIENT)
Dept: GASTROENTEROLOGY | Facility: CLINIC | Age: 63
End: 2023-08-15
Payer: COMMERCIAL

## 2024-05-21 ENCOUNTER — OFFICE VISIT (OUTPATIENT)
Dept: INTERNAL MEDICINE | Facility: CLINIC | Age: 64
End: 2024-05-21
Payer: COMMERCIAL

## 2024-05-21 VITALS
DIASTOLIC BLOOD PRESSURE: 76 MMHG | HEART RATE: 72 BPM | BODY MASS INDEX: 35.01 KG/M2 | HEIGHT: 68 IN | RESPIRATION RATE: 16 BRPM | TEMPERATURE: 98.6 F | SYSTOLIC BLOOD PRESSURE: 138 MMHG | WEIGHT: 231 LBS

## 2024-05-21 DIAGNOSIS — Z12.5 SCREENING FOR PROSTATE CANCER: ICD-10-CM

## 2024-05-21 DIAGNOSIS — Z00.00 PHYSICAL EXAM: Primary | ICD-10-CM

## 2024-05-21 DIAGNOSIS — Z13.6 SCREENING FOR CARDIOVASCULAR CONDITION: ICD-10-CM

## 2024-05-21 DIAGNOSIS — R31.9 HEMATURIA, UNSPECIFIED TYPE: ICD-10-CM

## 2024-05-21 LAB
ALBUMIN SERPL-MCNC: 4 G/DL (ref 3.5–5.2)
ALBUMIN/GLOB SERPL: 1.3 G/DL
ALP SERPL-CCNC: 67 U/L (ref 39–117)
ALT SERPL W P-5'-P-CCNC: 23 U/L (ref 1–41)
ANION GAP SERPL CALCULATED.3IONS-SCNC: 10.6 MMOL/L (ref 5–15)
AST SERPL-CCNC: 22 U/L (ref 1–40)
BACTERIA UR QL AUTO: NORMAL /HPF
BASOPHILS # BLD AUTO: 0.03 10*3/MM3 (ref 0–0.2)
BASOPHILS NFR BLD AUTO: 0.5 % (ref 0–1.5)
BILIRUB BLD-MCNC: NEGATIVE MG/DL
BILIRUB SERPL-MCNC: 0.4 MG/DL (ref 0–1.2)
BUN SERPL-MCNC: 18 MG/DL (ref 8–23)
BUN/CREAT SERPL: 21.2 (ref 7–25)
CALCIUM SPEC-SCNC: 9.2 MG/DL (ref 8.6–10.5)
CHLORIDE SERPL-SCNC: 108 MMOL/L (ref 98–107)
CHOLEST SERPL-MCNC: 121 MG/DL (ref 0–200)
CLARITY, POC: CLEAR
CO2 SERPL-SCNC: 23.4 MMOL/L (ref 22–29)
COLOR UR: YELLOW
CREAT SERPL-MCNC: 0.85 MG/DL (ref 0.76–1.27)
DEPRECATED RDW RBC AUTO: 42.3 FL (ref 37–54)
EGFRCR SERPLBLD CKD-EPI 2021: 98.2 ML/MIN/1.73
EOSINOPHIL # BLD AUTO: 0.46 10*3/MM3 (ref 0–0.4)
EOSINOPHIL NFR BLD AUTO: 8 % (ref 0.3–6.2)
ERYTHROCYTE [DISTWIDTH] IN BLOOD BY AUTOMATED COUNT: 13.1 % (ref 12.3–15.4)
EXPIRATION DATE: ABNORMAL
GLOBULIN UR ELPH-MCNC: 3.1 GM/DL
GLUCOSE SERPL-MCNC: 110 MG/DL (ref 65–99)
GLUCOSE UR STRIP-MCNC: NEGATIVE MG/DL
HCT VFR BLD AUTO: 45.1 % (ref 37.5–51)
HDLC SERPL-MCNC: 50 MG/DL (ref 40–60)
HGB BLD-MCNC: 15.1 G/DL (ref 13–17.7)
HYALINE CASTS UR QL AUTO: NORMAL /LPF
IMM GRANULOCYTES # BLD AUTO: 0.02 10*3/MM3 (ref 0–0.05)
IMM GRANULOCYTES NFR BLD AUTO: 0.3 % (ref 0–0.5)
KETONES UR QL: NEGATIVE
LDLC SERPL CALC-MCNC: 54 MG/DL (ref 0–100)
LDLC/HDLC SERPL: 1.06 {RATIO}
LEUKOCYTE EST, POC: NEGATIVE
LYMPHOCYTES # BLD AUTO: 1.78 10*3/MM3 (ref 0.7–3.1)
LYMPHOCYTES NFR BLD AUTO: 31 % (ref 19.6–45.3)
Lab: ABNORMAL
MCH RBC QN AUTO: 30 PG (ref 26.6–33)
MCHC RBC AUTO-ENTMCNC: 33.5 G/DL (ref 31.5–35.7)
MCV RBC AUTO: 89.7 FL (ref 79–97)
MONOCYTES # BLD AUTO: 0.51 10*3/MM3 (ref 0.1–0.9)
MONOCYTES NFR BLD AUTO: 8.9 % (ref 5–12)
NEUTROPHILS NFR BLD AUTO: 2.94 10*3/MM3 (ref 1.7–7)
NEUTROPHILS NFR BLD AUTO: 51.3 % (ref 42.7–76)
NITRITE UR-MCNC: NEGATIVE MG/ML
NRBC BLD AUTO-RTO: 0 /100 WBC (ref 0–0.2)
PH UR: 5 [PH] (ref 5–8)
PLATELET # BLD AUTO: 187 10*3/MM3 (ref 140–450)
PMV BLD AUTO: 10.8 FL (ref 6–12)
POTASSIUM SERPL-SCNC: 4.3 MMOL/L (ref 3.5–5.2)
PROT SERPL-MCNC: 7.1 G/DL (ref 6–8.5)
PROT UR STRIP-MCNC: NEGATIVE MG/DL
PSA SERPL-MCNC: 1 NG/ML (ref 0–4)
RBC # BLD AUTO: 5.03 10*6/MM3 (ref 4.14–5.8)
RBC # UR STRIP: ABNORMAL /UL
RBC # UR STRIP: NORMAL /HPF
REF LAB TEST METHOD: NORMAL
SODIUM SERPL-SCNC: 142 MMOL/L (ref 136–145)
SP GR UR: 1.02 (ref 1–1.03)
SQUAMOUS #/AREA URNS HPF: NORMAL /HPF
TRIGL SERPL-MCNC: 89 MG/DL (ref 0–150)
TSH SERPL DL<=0.05 MIU/L-ACNC: 2.54 UIU/ML (ref 0.27–4.2)
UROBILINOGEN UR QL: ABNORMAL
VLDLC SERPL-MCNC: 17 MG/DL (ref 5–40)
WBC # UR STRIP: NORMAL /HPF
WBC NRBC COR # BLD AUTO: 5.74 10*3/MM3 (ref 3.4–10.8)

## 2024-05-21 PROCEDURE — 81003 URINALYSIS AUTO W/O SCOPE: CPT | Performed by: INTERNAL MEDICINE

## 2024-05-21 PROCEDURE — 80050 GENERAL HEALTH PANEL: CPT | Performed by: INTERNAL MEDICINE

## 2024-05-21 PROCEDURE — G0103 PSA SCREENING: HCPCS | Performed by: INTERNAL MEDICINE

## 2024-05-21 PROCEDURE — 81015 MICROSCOPIC EXAM OF URINE: CPT | Performed by: INTERNAL MEDICINE

## 2024-05-21 PROCEDURE — 99396 PREV VISIT EST AGE 40-64: CPT | Performed by: INTERNAL MEDICINE

## 2024-05-21 PROCEDURE — 80061 LIPID PANEL: CPT | Performed by: INTERNAL MEDICINE

## 2024-05-21 PROCEDURE — 36415 COLL VENOUS BLD VENIPUNCTURE: CPT | Performed by: INTERNAL MEDICINE

## 2024-05-21 NOTE — PROGRESS NOTES
"Chief Complaint   Patient presents with    Annual Exam       History of Present Illness    The patient presents for an established patient physical examination and health maintenance visit.    Medications    No current outpatient medications on file.     Allergies    No Known Allergies    Problem List    Patient Active Problem List   Diagnosis    Enlarged prostate without lower urinary tract symptoms (luts)       Medications, Allergies, Problems List and Past History were reviewed and updated.    Physical Examination    /76 (BP Location: Left arm, Patient Position: Sitting, Cuff Size: Adult)   Pulse 72   Temp 98.6 °F (37 °C) (Infrared)   Resp 16   Ht 172.7 cm (68\")   Wt 105 kg (231 lb)   BMI 35.12 kg/m²       HEENT: Head- Normocephalic Atraumatic. Facies- Within normal limits. Pinnas- Normal texture and shape bilaterally. Canals- Normal bilaterally. TMs- Normal bilaterally. Nares- Patent bilaterally. Nasal Septum- is normal. There is no tenderness to palpation over the frontal or maxillary sinuses. Lids- Normal bilaterally. Conjunctiva- Clear bilaterally. Sclera- Anicteric bilaterally. Oropharynx- Moist with no lesions. Tonsils- No enlargement, erythema or exudate.    Neck: Thyroid- non enlarged, symmetric and has no nodules. No bruits are detected. ROM- Normal Range of Motion with no rigidity.    Lungs: Auscultation- Clear to auscultation bilaterally. There are no retractions, clubbing or cyanosis. The Expiratory to Inspiratory ratio is equal.    Lymph Nodes: Cervical- no enlarged lymph nodes noted. Clavicular- Deferred. Axillary- Deferred. Inguinal- Deferred.    Cardiovascular: There are no carotid bruits. Heart- Normal Rate with Regular rhythm and no murmurs. There are no gallops. There are no rubs. In the lower extremities there is no edema. The upper extremities do not have edema.    Abdomen: Soft, benign, non-tender with no masses, hernias, organomegaly or scars.    Male Genitourinary: The penis is " uncircumcised with testicles found in the scrotum bilaterally. Testicular Size is normal bilaterally. The penis has no anatomic abnormalities.    Rectal: reveals normal external sphincter tone with no external lesions.    Prostate: The prostate gland is symmetric and smooth with no nodularity.    Dermatologic: The patient has no worrisome or suspicious skin lesions noted.    Impression and Assessment    Normal Physical Examination.    Encounter for Screening for Malignant Neoplasm of the Prostate.    Plan    Counseling was provided regarding: Adequate Aerobic Exercise, Dental Visits, Flossing Teeth, Heart Healthy Diet, Seat Belt Utilization and Weight Lose.    The following was ordered for screening and health maintenance: CBC w Automated Diff, CMP, Lipid Profile, PSA, TSH and U/A.       Immunizations Ordered and Administered: None.    Diagnoses and all orders for this visit:    1. Physical exam (Primary)  -     CBC & Differential; Future  -     Comprehensive Metabolic Panel; Future  -     TSH; Future  -     POC Urinalysis Dipstick, Automated; Future    2. Screening for prostate cancer  -     PSA Screen; Future    3. Screening for cardiovascular condition  -     Lipid Panel; Future      Class 2 Severe Obesity (BMI >=35 and <=39.9). Obesity-related health conditions include the following: none. Obesity is unchanged. BMI is is above average; BMI management plan is completed. We discussed low calorie, low carb based diet program, portion control, increasing exercise, and Information on healthy weight added to patient's after visit summary.        Return to Office    The patient was instructed to return for follow-up in 1 year. The patient was instructed to return sooner if the condition changes, worsens, or does not resolve.

## 2024-06-13 ENCOUNTER — TELEPHONE (OUTPATIENT)
Dept: INTERNAL MEDICINE | Facility: CLINIC | Age: 64
End: 2024-06-13
Payer: COMMERCIAL

## 2024-06-13 NOTE — TELEPHONE ENCOUNTER
Jaden called and states the visit for 5/21/24 needs a code review. She states the CPT code and Dx code have to be listed on jaden's code list. The codes that are being billed that needs to be changed are 00883 and 93426. She states the coders for our office should have the list. She said the billing should be put on hold until corrected. I gave her jyotsna number and she said to let the provider know and with questions to call provider services at [phone].

## 2025-05-27 ENCOUNTER — OFFICE VISIT (OUTPATIENT)
Dept: INTERNAL MEDICINE | Facility: CLINIC | Age: 65
End: 2025-05-27
Payer: COMMERCIAL

## 2025-05-27 VITALS
BODY MASS INDEX: 34.56 KG/M2 | HEIGHT: 68 IN | SYSTOLIC BLOOD PRESSURE: 124 MMHG | WEIGHT: 228 LBS | DIASTOLIC BLOOD PRESSURE: 82 MMHG | TEMPERATURE: 98.7 F | RESPIRATION RATE: 16 BRPM | HEART RATE: 72 BPM

## 2025-05-27 DIAGNOSIS — R31.9 HEMATURIA, UNSPECIFIED TYPE: ICD-10-CM

## 2025-05-27 DIAGNOSIS — Z00.00 PHYSICAL EXAM: Primary | ICD-10-CM

## 2025-05-27 DIAGNOSIS — Z12.5 SCREENING FOR PROSTATE CANCER: ICD-10-CM

## 2025-05-27 DIAGNOSIS — Z13.6 SCREENING FOR CARDIOVASCULAR CONDITION: ICD-10-CM

## 2025-05-27 LAB
ALBUMIN SERPL-MCNC: 4.2 G/DL (ref 3.5–5.2)
ALBUMIN/GLOB SERPL: 1.4 G/DL
ALP SERPL-CCNC: 72 U/L (ref 39–117)
ALT SERPL W P-5'-P-CCNC: 27 U/L (ref 1–41)
ANION GAP SERPL CALCULATED.3IONS-SCNC: 10.9 MMOL/L (ref 5–15)
AST SERPL-CCNC: 26 U/L (ref 1–40)
BACTERIA UR QL AUTO: NORMAL /HPF
BASOPHILS # BLD AUTO: 0.03 10*3/MM3 (ref 0–0.2)
BASOPHILS NFR BLD AUTO: 0.6 % (ref 0–1.5)
BILIRUB BLD-MCNC: NEGATIVE MG/DL
BILIRUB SERPL-MCNC: 0.4 MG/DL (ref 0–1.2)
BUN SERPL-MCNC: 20 MG/DL (ref 8–23)
BUN/CREAT SERPL: 20.6 (ref 7–25)
CALCIUM SPEC-SCNC: 9.1 MG/DL (ref 8.6–10.5)
CHLORIDE SERPL-SCNC: 107 MMOL/L (ref 98–107)
CHOLEST SERPL-MCNC: 119 MG/DL (ref 0–200)
CLARITY, POC: CLEAR
CO2 SERPL-SCNC: 23.1 MMOL/L (ref 22–29)
COLOR UR: ABNORMAL
CREAT SERPL-MCNC: 0.97 MG/DL (ref 0.76–1.27)
DEPRECATED RDW RBC AUTO: 45.2 FL (ref 37–54)
EGFRCR SERPLBLD CKD-EPI 2021: 87.7 ML/MIN/1.73
EOSINOPHIL # BLD AUTO: 0.43 10*3/MM3 (ref 0–0.4)
EOSINOPHIL NFR BLD AUTO: 8 % (ref 0.3–6.2)
ERYTHROCYTE [DISTWIDTH] IN BLOOD BY AUTOMATED COUNT: 13.2 % (ref 12.3–15.4)
EXPIRATION DATE: ABNORMAL
GLOBULIN UR ELPH-MCNC: 2.9 GM/DL
GLUCOSE SERPL-MCNC: 103 MG/DL (ref 65–99)
GLUCOSE UR STRIP-MCNC: NEGATIVE MG/DL
HCT VFR BLD AUTO: 43.5 % (ref 37.5–51)
HDLC SERPL-MCNC: 48 MG/DL (ref 40–60)
HGB BLD-MCNC: 15 G/DL (ref 13–17.7)
HYALINE CASTS UR QL AUTO: NORMAL /LPF
IMM GRANULOCYTES # BLD AUTO: 0.01 10*3/MM3 (ref 0–0.05)
IMM GRANULOCYTES NFR BLD AUTO: 0.2 % (ref 0–0.5)
KETONES UR QL: NEGATIVE
LDLC SERPL CALC-MCNC: 55 MG/DL (ref 0–100)
LDLC/HDLC SERPL: 1.16 {RATIO}
LEUKOCYTE EST, POC: NEGATIVE
LYMPHOCYTES # BLD AUTO: 2.22 10*3/MM3 (ref 0.7–3.1)
LYMPHOCYTES NFR BLD AUTO: 41.1 % (ref 19.6–45.3)
Lab: ABNORMAL
MCH RBC QN AUTO: 31.4 PG (ref 26.6–33)
MCHC RBC AUTO-ENTMCNC: 34.5 G/DL (ref 31.5–35.7)
MCV RBC AUTO: 91.2 FL (ref 79–97)
MONOCYTES # BLD AUTO: 0.47 10*3/MM3 (ref 0.1–0.9)
MONOCYTES NFR BLD AUTO: 8.7 % (ref 5–12)
NEUTROPHILS NFR BLD AUTO: 2.24 10*3/MM3 (ref 1.7–7)
NEUTROPHILS NFR BLD AUTO: 41.4 % (ref 42.7–76)
NITRITE UR-MCNC: NEGATIVE MG/ML
NRBC BLD AUTO-RTO: 0 /100 WBC (ref 0–0.2)
PH UR: 5 [PH] (ref 5–8)
PLATELET # BLD AUTO: 196 10*3/MM3 (ref 140–450)
PMV BLD AUTO: 11 FL (ref 6–12)
POTASSIUM SERPL-SCNC: 4.1 MMOL/L (ref 3.5–5.2)
PROT SERPL-MCNC: 7.1 G/DL (ref 6–8.5)
PROT UR STRIP-MCNC: NEGATIVE MG/DL
PSA SERPL-MCNC: 1.19 NG/ML (ref 0–4)
RBC # BLD AUTO: 4.77 10*6/MM3 (ref 4.14–5.8)
RBC # UR STRIP: ABNORMAL /UL
RBC # UR STRIP: NORMAL /HPF
REF LAB TEST METHOD: NORMAL
SODIUM SERPL-SCNC: 141 MMOL/L (ref 136–145)
SP GR UR: 1.02 (ref 1–1.03)
SQUAMOUS #/AREA URNS HPF: NORMAL /HPF
TRIGL SERPL-MCNC: 77 MG/DL (ref 0–150)
TSH SERPL DL<=0.05 MIU/L-ACNC: 2.01 UIU/ML (ref 0.27–4.2)
UROBILINOGEN UR QL: NORMAL
VLDLC SERPL-MCNC: 16 MG/DL (ref 5–40)
WBC # UR STRIP: NORMAL /HPF
WBC NRBC COR # BLD AUTO: 5.4 10*3/MM3 (ref 3.4–10.8)

## 2025-05-27 PROCEDURE — 81015 MICROSCOPIC EXAM OF URINE: CPT | Performed by: INTERNAL MEDICINE

## 2025-05-27 PROCEDURE — 80050 GENERAL HEALTH PANEL: CPT | Performed by: INTERNAL MEDICINE

## 2025-05-27 PROCEDURE — 81003 URINALYSIS AUTO W/O SCOPE: CPT | Performed by: INTERNAL MEDICINE

## 2025-05-27 PROCEDURE — 36415 COLL VENOUS BLD VENIPUNCTURE: CPT | Performed by: INTERNAL MEDICINE

## 2025-05-27 PROCEDURE — G0103 PSA SCREENING: HCPCS | Performed by: INTERNAL MEDICINE

## 2025-05-27 PROCEDURE — 99396 PREV VISIT EST AGE 40-64: CPT | Performed by: INTERNAL MEDICINE

## 2025-05-27 PROCEDURE — 80061 LIPID PANEL: CPT | Performed by: INTERNAL MEDICINE

## 2025-05-27 NOTE — PROGRESS NOTES
"Chief Complaint   Patient presents with    Annual Exam       History of Present Illness      The patient presents for an established patient physical examination and health maintenance visit. The patient has had a colonoscopy.    Medications    No current outpatient medications on file.     Allergies    No Known Allergies    Problem List    Patient Active Problem List   Diagnosis    Enlarged prostate without lower urinary tract symptoms (luts)       Medications, Allergies, Problems List and Past History were reviewed and updated.    Physical Examination    /82 (BP Location: Left arm, Patient Position: Sitting, Cuff Size: Adult)   Pulse 72   Temp 98.7 °F (37.1 °C) (Infrared)   Resp 16   Ht 172.7 cm (68\")   Wt 103 kg (228 lb)   BMI 34.67 kg/m²       HEENT: Head- Normocephalic Atraumatic. Facies- Within normal limits. Pinnas- Normal texture and shape bilaterally. Canals- Normal bilaterally. TMs- Normal bilaterally. Nares- Patent bilaterally. Nasal Septum- is normal. There is no tenderness to palpation over the frontal or maxillary sinuses. Lids- Normal bilaterally. Conjunctiva- Clear bilaterally. Sclera- Anicteric bilaterally. Oropharynx- Moist with no lesions. Tonsils- No enlargement, erythema or exudate.    Neck: Thyroid- non enlarged, symmetric and has no nodules. No bruits are detected. ROM- Normal Range of Motion with no rigidity.    Lungs: Auscultation- Clear to auscultation bilaterally. There are no retractions, clubbing or cyanosis. The Expiratory to Inspiratory ratio is equal.    Lymph Nodes: Cervical- no enlarged lymph nodes noted. Clavicular- Deferred. Axillary- Deferred. Inguinal- Deferred.    Cardiovascular: There are no carotid bruits. Heart- Normal Rate with Regular rhythm and no murmurs. There are no gallops. There are no rubs. In the lower extremities there is no edema. The upper extremities do not have edema.      Abdomen: Soft, benign, non-tender with no masses, hernias, organomegaly or " scars.    Male Genitourinary: The penis is uncircumcised with testicles found in the scrotum bilaterally. Testicular Size is normal bilaterally. The penis has no anatomic abnormalities.    Rectal: reveals normal external sphincter tone with no external lesions.    Prostate: The prostate gland is symmetric and smooth with no nodularity.    Dermatologic: The patient has no worrisome or suspicious skin lesions noted.    Impression and Assessment    Normal Physical Examination.    Encounter for Screening for Malignant Neoplasm of the Prostate.    Plan    Counseling was provided regarding: Adequate Aerobic Exercise, Dental Visits, Flossing Teeth, Heart Healthy Diet, Seat Belt Utilization and Weight Lose.    The following was ordered for screening and health maintenance: CBC w Automated Diff, CMP, Lipid Profile, PSA, TSH and U/A.       Immunizations Ordered and Administered: None.    Diagnoses and all orders for this visit:    1. Physical exam (Primary)  -     CBC & Differential; Future  -     Comprehensive Metabolic Panel; Future  -     TSH; Future  -     POC Urinalysis Dipstick, Automated; Future    2. Screening for prostate cancer  -     PSA Screen; Future    3. Screening for cardiovascular condition  -     Lipid Panel; Future        BMI is >= 30 and <35. (Class 1 Obesity). The following options were offered after discussion;: weight loss educational material (shared in after visit summary), exercise counseling/recommendations, nutrition counseling/recommendations, and Information on healthy weight added to patient's after visit summary.      Return to Office    The patient was instructed to return for follow-up in 1 year. The patient was instructed to return sooner if the condition changes, worsens, or does not resolve.